# Patient Record
Sex: FEMALE | Race: WHITE | NOT HISPANIC OR LATINO | Employment: FULL TIME | URBAN - METROPOLITAN AREA
[De-identification: names, ages, dates, MRNs, and addresses within clinical notes are randomized per-mention and may not be internally consistent; named-entity substitution may affect disease eponyms.]

---

## 2019-09-13 ENCOUNTER — TELEPHONE (OUTPATIENT)
Dept: DERMATOLOGY | Facility: CLINIC | Age: 59
End: 2019-09-13

## 2019-09-13 NOTE — TELEPHONE ENCOUNTER
Telephone call from patient wanting a records release form mailed to her   Mailed form to patient , she will drop it off at [facility] dermatology

## 2019-10-09 ENCOUNTER — OFFICE VISIT (OUTPATIENT)
Dept: DERMATOLOGY | Facility: CLINIC | Age: 59
End: 2019-10-09
Payer: COMMERCIAL

## 2019-10-09 VITALS — HEIGHT: 62 IN | WEIGHT: 128 LBS | TEMPERATURE: 98.5 F | BODY MASS INDEX: 23.55 KG/M2

## 2019-10-09 DIAGNOSIS — D48.5 NEOPLASM OF UNCERTAIN BEHAVIOR OF SKIN: ICD-10-CM

## 2019-10-09 DIAGNOSIS — L71.9 ROSACEA: ICD-10-CM

## 2019-10-09 DIAGNOSIS — Z85.828 HISTORY OF BASAL CELL CARCINOMA: Primary | ICD-10-CM

## 2019-10-09 PROCEDURE — 99214 OFFICE O/P EST MOD 30 MIN: CPT | Performed by: DERMATOLOGY

## 2019-10-09 PROCEDURE — 88305 TISSUE EXAM BY PATHOLOGIST: CPT | Performed by: PATHOLOGY

## 2019-10-09 PROCEDURE — 11102 TANGNTL BX SKIN SINGLE LES: CPT | Performed by: DERMATOLOGY

## 2019-10-09 RX ORDER — ESTRADIOL AND NORETHINDRONE ACETATE 1; .5 MG/1; MG/1
1 TABLET ORAL DAILY
COMMUNITY

## 2019-10-09 RX ORDER — SULFACETAMIDE SODIUM, SULFUR 98; 48 MG/G; MG/G
LIQUID TOPICAL
Qty: 1 BOTTLE | Refills: 6 | Status: SHIPPED | OUTPATIENT
Start: 2019-10-09 | End: 2020-03-03 | Stop reason: SDUPTHER

## 2019-10-09 RX ORDER — MAGNESIUM GLUCONATE 30 MG(550)
1000 TABLET ORAL DAILY
COMMUNITY

## 2019-10-09 RX ORDER — FLUTICASONE PROPIONATE 50 MCG
1 SPRAY, SUSPENSION (ML) NASAL DAILY
COMMUNITY

## 2019-10-09 RX ORDER — PANTOPRAZOLE SODIUM 40 MG/1
40 TABLET, DELAYED RELEASE ORAL 2 TIMES DAILY
COMMUNITY
Start: 2017-04-07

## 2019-10-09 RX ORDER — FEXOFENADINE HCL 180 MG/1
180 TABLET ORAL DAILY PRN
COMMUNITY

## 2019-10-09 RX ORDER — OLOPATADINE HYDROCHLORIDE 1 MG/ML
1 SOLUTION/ DROPS OPHTHALMIC 2 TIMES DAILY
COMMUNITY
Start: 2018-10-09 | End: 2019-10-09

## 2019-10-09 RX ORDER — LEVOTHYROXINE SODIUM 88 UG/1
88 TABLET ORAL DAILY
COMMUNITY
Start: 2019-09-19

## 2019-10-09 NOTE — PROGRESS NOTES
[address] [facility] Note     Patient Name: Niels Carreno  Encounter Date: 10/09/2019    Today's Chief Concerns:  Jean Vibha Concern #1:  Skin exam    Past Medical History:  Have you ever had or currently have any of the following medical conditions or treatments? · HIV/AIDS: No  · Hepatitis B: No  · Hepatitis C: No   · Diabetes: No  · Tuberculosis: No  · Biologic Therapy/Chemotherapy: No  · Organ or Bone Marrow Transplantation: No  · Radiation Treatment: No  · Cancer (If Yes, which types)- No      Have you ever had any of the following skin conditions? · Melanoma? (If Yes, please provide more detail)- No  · Basal Cell Carcinoma: YES  · Squamous Cell Carcinoma: No  · Sebaceous Cell Carcinoma: No  · Merkel Cell Carcinoma: No  · Angiosarcoma: No  · Blistering Sunburns: No  · Eczema: No  · Psoriasis: No    Social History:    What is your current Smoking Status? Non smoker    What is/was your primary occupation? Teacher    What are your hobbies/past-times? Family history:  Do any of your "first degree relatives" (parent, brother, sister, or child) have any of the following conditions? · Melanoma? (If Yes, which relatives?) No  · Eczema: No  · Asthma: No  · Hay Fever/Seasonal Allergies: YES, mother  · Psoriasis: No  · Arthritis: YES, father  · Thyroid Problems: No  · Lupus/Connective Tissue Disease: No  · Diabetes: No  · Stroke: YES, father  · Blood Clots: No  · IBD/Crohn's/Ulcerative Colitis: No  · Vitiligo: No  · Scarring/Keloids: No  · Severe Acne: No  · Pancreatic Cancer: No  · Other known Skin Condition? If Yes, what condition and which relatives?   No    Current Medications:    Current Outpatient Medications:     Cyanocobalamin (VITAMIN B 12 PO), Take 500 mg by mouth daily, Disp: , Rfl:     estradiol-norethindrone (ACTIVELLA) 1-0 5 MG per tablet, Take 1 tablet by mouth daily, Disp: , Rfl:     fexofenadine (ALLEGRA) 180 MG tablet, Take 180 mg by mouth daily as needed, Disp: , Rfl:    fluticasone (FLONASE) 50 mcg/act nasal spray, 1 spray into each nostril daily, Disp: , Rfl:     levothyroxine 88 mcg tablet, Take 88 mcg by mouth daily, Disp: , Rfl:     Magnesium Gluconate 550 MG TABS, Take 1,000 mg by mouth daily, Disp: , Rfl:     olopatadine (PATANOL) 0 1 % ophthalmic solution, Apply 1 drop to eye 2 (two) times a day, Disp: , Rfl:     pantoprazole (PROTONIX) 40 mg tablet, Take 40 mg by mouth 2 (two) times a day, Disp: , Rfl:     Specific Alerts:    Have you been seen by a [facility] Dermatologist in the last 3 years? YES    Are you pregnant or planning to become pregnant? N/A    Are you currently or planning to be nursing or breast feeding? N/A    No Known Allergies    May we call your Preferred Phone number to discuss your specific medical information? YES    May we leave a detailed message that includes your specific medical information? YES    Have you traveled outside of the [facility] in the past 3 months? No    Do you currently have a pacemaker or defibrillator? No    Do you have any artificial heart valves, joints, plates, screws, rods, stents, pins, etc? No   - If Yes, were any placed within the last 2 years? Do you require any medications prior to a surgical procedure? No   - If Yes, for which procedure? - If Yes, what medications to you require? Are you taking any medications that cause you to bleed more easily ("blood thinners") No    Have you ever experienced a rapid heartbeat with epinephrine? No    Have you ever been treated with "gold" (gold sodium thiomalate) therapy? No    [facility] can help with wrinkles, "laugh lines," facial volume loss, "double chin," "love handles," age spots, and more  Are you interested in learning today about some of the skin enhancement procedures that we offer? (If Yes, please provide more detail) No    Review of Systems:  Have you recently had or currently have any of the following?     · Fever or chills: No  · Night Sweats: No  · Headaches: YES, allergies  · Weight Gain: No  · Weight Loss: No  · Blurry Vision: No  · Nausea: No  · Vomiting: No  · Diarrhea: No  · Blood in Stool: No  · Abdominal Pain: No  · Itchy Skin: No  · Painful Joints: No  · Swollen Joints: No  · Muscle Pain: No  · Irregular Mole: No  · Sun Burn: No  · Dry Skin: No  · Skin Color Changes: No  · Scar or Keloid: No  · Cold Sores/Fever Blisters: No  · Bacterial Infections/MRSA: No  · Anxiety: YES  · Depression: No  · Suicidal or Homicidal Thoughts: No      PHYSICAL EXAM:      Was a chaperone (Derm Clinical Assistant) present for the entirety of the Physical Exam? YES    Did the Dermatology Team specifically ask and  the patient on the importance of a Full Skin Exam to be sure that nothing is missed clinically?  YES    Did the patient request or accept a Full Skin Exam?  YES    Did the patient specifically refuse to have the areas "under-the-bra" examined by the Dermatologist? No    Did the patient specifically refuse to have the areas "under-the-underwear" examined by the Dermatologist? No      CONSTITUTIONAL:   Vitals:    10/09/19 1454   Temp: 98 5 °F (36 9 °C)   TempSrc: Tympanic   Weight: 58 1 kg (128 lb)   Height: 5' 2" (1 575 m)       Today's Height:   5' 2"  Today's Weight (in kilograms):   58 1 kg  Today's Temperature:   98 5 F    PSYCH: Normal mood and affect  EYES: Normal conjunctiva  ENT: Normal lips and oral mucosa  CARDIOVASCULAR: No edema  RESPIRATORY: Normal respirations  HEME/LYMPH/IMMUNO:  No regional lymphadenopathy except as noted below in ASSESSMENT AND PLAN BY DIAGNOSIS    FULL ORGAN SYSTEM SKIN EXAM (SKIN)  Hair, Scalp, Ears, Face Normal except as noted below in Assessment   Neck, Cervical Chain Nodes Normal except as noted below in Assessment   Right Arm/Hand/Fingers Normal except as noted below in Assessment   Left Arm/Hand/Fingers Normal except as noted below in Assessment   Chest/Breasts/Axillae Viewed areas Normal except as noted below in Assessment   Abdomen, Umbilicus Normal except as noted below in Assessment   Back/Spine Normal except as noted below in Assessment   Groin/Genitalia/Buttocks Viewed areas Normal except as noted below in Assessment   Right Leg, Foot, Toes Normal except as noted below in Assessment   Left Leg, Foot, Toes Normal except as noted below in Assessment        ASSESSMENT AND PLAN BY DIAGNOSIS:    History of Present Condition:    1  ROSACEA    Physical Exam:   Anatomic Location Affected: Face   Morphological Description:  Erythema with mild fine papularity   Pertinent Positives:   Pertinent Negatives:No lymphadenopathy    Additional History of Present Condition:      Assessment and Plan:  Based on a thorough discussion of this condition and the management approach to it (including a comprehensive discussion of the known risks, side effects and potential benefits of treatment), the patient (family) agrees to implement the following specific plan:   Plexion cleanser   Metronidazole cream twice daily    Rosacea is a chronic rash affecting the mid-face including the nose, cheeks, chin, forehead, and eyelids  The incidence is usually greatest between the ages of 30-60 years and is more common in people with fair skin  Common characteristics include redness, telangiectasias, papules and pustules over affected areas  Rosacea may look similar to acne, but there is a lack of comedones  Occasionally the eyes may also be involved in ocular rosacea  In advanced disease, enlargement of the sebaceous glands in the nose, termed rhinophyma, may be present  Rosacea results in red spots (papules) and sometimes pustules over the face, but unlike acne there are no blackheads, whiteheads, or cystic nodules  Patients often experience increased facial flushing with prominent blood vessels (erythematotelangiectatic rosacea) and dry, sensitive skin   These symptoms are exacerbated by sun exposure, hot or spicy foods, topical steroids and oil-based facial products  In ocular rosacea, eyelids may be red and sore due to conjunctivitis, keratitis, and episcleritis  If rhinophyma develops due to enlargement of sebaceous glands, the patient may have an enlarged and irregularly shaped nose with prominent pores  In rosacea that is refractory to treatment, patients can develop persistent redness and swelling of the face due to lymphatic obstruction (Morbihan disease)  Distribution around the cheeks may be confused with the malar or butterfly rash of lupus  However, the rash of lupus spares the nasal creases and lacks papules and pustules  If signs of photosensitivity, oral ulcers, arthritis, and kidney dysfunction are present then consider referral to a rheumatologist      There are many potential causes of rosacea including genetic, environmental, vascular, and inflammatory factors   These include, but are not limited to:   Chronic exposure to ultraviolet radiation    Increased immune responses in the form of cathelicidins that promote vessel dilation and infiltration with white blood cells (neutrophils) into the dermis   Increased matrix metalloproteinases such as collagen and elastase that remodel normal tissue may contribute to inflammation of the skin making it thicker and harder   There is some evidence to suggest that increased numbers of demodex mites on patient skin may contribute to rosacea papules     General Treatment Approach    Avoid exacerbating factors such as heat, spicy foods, and alcohol    Use daily SPF30+ sunscreen and other methods of coverage for sun protection   Use water-based make-up    Avoid applying topical steroids to affected areas as they can cause perioral dermatitis and exacerbate rosacea     Topical Treatment Approach   Metronidazole cream or gel by itself or in combination with oral antibiotics for more severe cases   Azelaic acid cream or lotion is effective for mild inflammatory rosacea when applied twice daily to affected areas   Brimonidine gel and oxymetazoline hydrochloride cream can reduce facial redness temporarily    Ivermectin cream can treat papulopustular rosacea by controlling demodex mites and inflammation    Pimecrolimus cream or tacrolimus ointment twice a day for 2-3 months can help reduce inflammation    Oral Treatment Approach   Antibiotics such as doxycycline, minocycline, or erythromycin for 1-3 months   Clonidine and carvedilol can help reduce facial flushing and are generally well tolerated  Common side effects include low blood pressure, gastrointestinal upset, dry eyes, blurred vision and low heart rate   Isotretinoin at low doses can be effective for long term treatment when antibiotics fail  Side effects may make it unsuitable for some patients   NSAIDs such as diclofenac can help reduce discomfort and redness in the skin  Procedural/Surgical Treatment Approach    Vascular lasers or intense pulsed light treatment may be used to treat persistent telangiectasia and papulopustular rosacea   Plastic surgery and carbon dioxide lasers may be used to treat rhinophyma       2   HISTORY OF BASAL CELL CARCINOMA GREATER THAN 30 SITES    Physical Exam:   Anatomic Location Affected:  Mulitple sites   Morphological Description of scar:  Well healed   Suspected Recurrence: No   Pertinent Positives:   Pertinent Negatives:No lymphadenopathy      Additional History of Present Condition:  History of basal cell carcinoma with no sign of recurrence    Assessment and Plan:  Based on a thorough discussion of this condition and the management approach to it (including a comprehensive discussion of the known risks, side effects and potential benefits of treatment), the patient (family) agrees to implement the following specific plan:   Continue routine skin exams   Recommend sun protection SPF 48 or higher    How can basal cell carcinoma be prevented? The most important way to prevent BCC is to avoid sunburn  This is especially important in childhood and early life  Fair skinned individuals and those with a personal or family history of BCC should protect their skin from sun exposure daily, year-round and lifelong   Stay indoors or under the shade in the middle of the day    Wear covering clothing    Apply high protection factor SPF50+ broad-spectrum sunscreens generously to exposed skin if outdoors    Avoid indoor tanning (sun beds, solaria)   Oral nicotinamide (vitamin B3) in a dose of 500 mg twice daily may reduce the number and severity of BCCs  What is the outlook for basal cell carcinoma? Most BCCs are cured by treatment  Cure is most likely if treatment is undertaken when the lesion is small  About 50% of people with BCC develop a second one within 3 years of the first  They are also at increased risk of other skin cancers, especially melanoma  Regular self-skin examinations and long-term annual skin checks by an experienced health professional are recommended  3  NEOPLASM OF UNCERTAIN BEHAVIOR OF SKIN    Physical Exam:   (Anatomic Location); (Size and Morphological Description); (Differential Diagnosis):  o Mid upper back; 1 cm pink red macule; Rule out Basal cell carcinoma   Pertinent Positives:   Pertinent Negatives:No lymphadenopathy    Additional History of Present Condition:      Assessment and Plan:   I have discussed with the patient that a sample of skin via a "skin biopsy would be potentially helpful to further make a specific diagnosis under the microscope   Based on a thorough discussion of this condition and the management approach to it (including a comprehensive discussion of the known risks, side effects and potential benefits of treatment), the patient (family) agrees to implement the following specific plan:    o Procedure:  Skin Biopsy    After a thorough discussion of treatment options and risk/benefits/alternatives (including but not limited to local pain, scarring, dyspigmentation, blistering, possible superinfection, and inability to confirm a diagnosis via histopathology), verbal and written consent were obtained and portion of the rash was biopsied for tissue sample  See below for consent that was obtained from patient and subsequent Procedure Note  PROCEDURE SHAVE BIOPSY NOTE:     Performing Physician: Dr Gregory   Anatomic Location; Clinical Description with size (cm); Pre-Op Diagnosis:   o Mid upper back; 1 cm pink red macule; Rule out Basal cell carcinoma   Post-op diagnosis: Same      Local anesthesia: 1% xylocaine with epi       Topical anesthesia: None     Hemostasis: Aluminum chloride       After obtaining informed consent  at which time there was a discussion about the purpose of biopsy  and low risks of infection and bleeding  The area was prepped and draped in the usual fashion  Anesthesia was obtained with 1% lidocaine with epinephrine  A shave biopsy to an appropriate sampling depth was obtained with a sterile blade (such as a 15-blade or DermaBlade)  The resulting wound was covered with surgical ointment and bandaged appropriately  The patient tolerated the procedure well without complications and was without signs of functional compromise  Specimen has been sent for review by Dermatopathology  Standard post-procedure care has been explained and has been included in written form within the patient's copy of Informed Consent  INFORMED CONSENT DISCUSSION AND POST-OPERATIVE INSTRUCTIONS FOR PATIENT    I   RATIONALE FOR PROCEDURE  I understand that a skin biopsy allows the Dermatologist to test a lesion or rash under the microscope to obtain a diagnosis  It usually involves numbing the area with numbing medication and removing a small piece of skin; sometimes the area will be closed with sutures  In this specific procedure, sutures are not usually needed    If any sutures are placed, then they are usually need to be removed in 2 weeks or less  I understand that my Dermatologist recommends that a skin "shave" biopsy be performed today  A local anesthetic, similar to the kind that a dentist uses when filling a cavity, will be injected with a very small needle into the skin area to be sampled  The injected skin and tissue underneath "will go to sleep and become numb so no pain should be felt afterwards  An instrument shaped like a tiny "razor blade" (shave biopsy instrument) will be used to cut a small piece of tissue and skin from the area so that a sample of tissue can be taken and examined more closely under the microscope  A slight amount of bleeding will occur, but it will be stopped with direct pressure and a pressure bandage and any other appropriate methods  I understands that a scar will form where the wound was created  Surgical ointment will be applied to help protect the wound  Sutures are not usually needed  II   RISKS AND POTENTIAL COMPLICATIONS   I understand the risks and potential complications of a skin biopsy include but are not limited to the following:   Bleeding   Infection   Pain   Scar/keloid   Skin discoloration   Incomplete Removal   Recurrence   Nerve Damage/Numbness/Loss of Function   Allergic Reaction to Anesthesia   Biopsies are diagnostic procedures and based on findings additional treatment or evaluation may be required   Loss or destruction of specimen resulting in no additional findings    My Dermatologist has explained to me the nature of the condition, the nature of the procedure, and the benefits to be reasonably expected compared with alternative approaches  My Dermatologist has discussed the likelihood of major risks or complications of this procedure including the specific risks listed above, such as bleeding, infection, and scarring/keloid  I understand that a scar is expected after this procedure    I understand that my physician cannot predict if the scar will form a "keloid," which extends beyond the borders of the wound that is created  A keloid is a thick, painful, and bumpy scar  A keloid can be difficult to treat, as it does not always respond well to therapy, which includes injecting cortisone directly into the keloid every few weeks  While this usually reduces the pain and size of the scar, it does not eliminate it  I understand that photographs may be taken before and after the procedure  These will be maintained as part of the medical providers confidential records and may not be made available to me  I further authorize the medical provider to use the photographs for teaching purposes or to illustrate scientific papers, books, or lectures if in his/her judgment, medical research, education, or science may benefit from its use  I have had an opportunity to fully inquire about the risks and benefits of this procedure and its alternatives  I have been given ample time and opportunity to ask questions and to seek a second opinion if I wished to do so  I acknowledge that there have specifically been no guarantees as to the cosmetic results from the procedure  I am aware that with any procedure there is always the possibility of an unexpected complication  III  POST-PROCEDURAL CARE (WHAT YOU WILL NEED TO DO "AFTER THE BIOPSY" TO OPTIMIZE HEALING)     Keep the area clean and dry  Try NOT to remove the bandage or get it wet for the first 24 hours   Gently clean the area and apply surgical ointment (such as Vaseline petrolatum ointment, which is available "over the counter" and not a prescription) to the biopsy site for up to 2 weeks straight  This acts to protect the wound from the outside world   Sutures are not usually placed in this procedure  If any sutures were placed, return for suture removal as instructed (generally 1 week for the face, 2 weeks for the body)   Take Acetaminophen (Tylenol) for discomfort, if no contraindications  Ibuprofen or aspirin could make bleeding worse   Call our office immediately for signs of infection: fever, chills, increased redness, warmth, tenderness, discomfort/pain, or pus or foul smell coming from the wound  WHAT TO DO IF THERE IS ANY BLEEDING? If a small amount of bleeding is noticed, place a clean cloth over the area and apply firm pressure for ten minutes  Check the wound after 10 minutes of direct pressure  If bleeding persists, try one more time for an additional 10 minutes of direct pressure on the area  If the bleeding becomes heavier or does not stop after the second attempt, or if you have any other questions about this procedure, then please call your [address] [place] [address]'s Dermatologist by calling [phone] (SKIN)  I hereby acknowledge that I have reviewed and verified the site with my Dermatologist and have requested and authorized my Dermatologist to proceed with the procedure        Scribe Attestation    I,:   Carolyn Gao MA am acting as a scribe while in the presence of the attending physician :        I,:   Gabriel Cobb MD personally performed the services described in this documentation    as scribed in my presence :

## 2019-10-09 NOTE — PATIENT INSTRUCTIONS
1  ROSACEA    Physical Exam:   Anatomic Location Affected: Face   Morphological Description:  Erythema with mild fine papularity    Assessment and Plan:  Based on a thorough discussion of this condition and the management approach to it (including a comprehensive discussion of the known risks, side effects and potential benefits of treatment), the patient (family) agrees to implement the following specific plan:   Plexion cleanser daily   Metronidazole cream twice a day    Rosacea is a chronic rash affecting the mid-face including the nose, cheeks, chin, forehead, and eyelids  The incidence is usually greatest between the ages of 30-60 years and is more common in people with fair skin  Common characteristics include redness, telangiectasias, papules and pustules over affected areas  Rosacea may look similar to acne, but there is a lack of comedones  Occasionally the eyes may also be involved in ocular rosacea  In advanced disease, enlargement of the sebaceous glands in the nose, termed rhinophyma, may be present  Rosacea results in red spots (papules) and sometimes pustules over the face, but unlike acne there are no blackheads, whiteheads, or cystic nodules  Patients often experience increased facial flushing with prominent blood vessels (erythematotelangiectatic rosacea) and dry, sensitive skin  These symptoms are exacerbated by sun exposure, hot or spicy foods, topical steroids and oil-based facial products  In ocular rosacea, eyelids may be red and sore due to conjunctivitis, keratitis, and episcleritis  If rhinophyma develops due to enlargement of sebaceous glands, the patient may have an enlarged and irregularly shaped nose with prominent pores  In rosacea that is refractory to treatment, patients can develop persistent redness and swelling of the face due to lymphatic obstruction (Morbihan disease)  Distribution around the cheeks may be confused with the malar or butterfly rash of lupus  However, the rash of lupus spares the nasal creases and lacks papules and pustules  If signs of photosensitivity, oral ulcers, arthritis, and kidney dysfunction are present then consider referral to a rheumatologist      There are many potential causes of rosacea including genetic, environmental, vascular, and inflammatory factors  These include, but are not limited to:   Chronic exposure to ultraviolet radiation    Increased immune responses in the form of cathelicidins that promote vessel dilation and infiltration with white blood cells (neutrophils) into the dermis   Increased matrix metalloproteinases such as collagen and elastase that remodel normal tissue may contribute to inflammation of the skin making it thicker and harder   There is some evidence to suggest that increased numbers of demodex mites on patient skin may contribute to rosacea papules     General Treatment Approach    Avoid exacerbating factors such as heat, spicy foods, and alcohol    Use daily SPF30+ sunscreen and other methods of coverage for sun protection   Use water-based make-up    Avoid applying topical steroids to affected areas as they can cause perioral dermatitis and exacerbate rosacea     Topical Treatment Approach   Metronidazole cream or gel by itself or in combination with oral antibiotics for more severe cases   Azelaic acid cream or lotion is effective for mild inflammatory rosacea when applied twice daily to affected areas   Brimonidine gel and oxymetazoline hydrochloride cream can reduce facial redness temporarily    Ivermectin cream can treat papulopustular rosacea by controlling demodex mites and inflammation    Pimecrolimus cream or tacrolimus ointment twice a day for 2-3 months can help reduce inflammation    Oral Treatment Approach   Antibiotics such as doxycycline, minocycline, or erythromycin for 1-3 months   Clonidine and carvedilol can help reduce facial flushing and are generally well tolerated  Common side effects include low blood pressure, gastrointestinal upset, dry eyes, blurred vision and low heart rate   Isotretinoin at low doses can be effective for long term treatment when antibiotics fail  Side effects may make it unsuitable for some patients   NSAIDs such as diclofenac can help reduce discomfort and redness in the skin  Procedural/Surgical Treatment Approach    Vascular lasers or intense pulsed light treatment may be used to treat persistent telangiectasia and papulopustular rosacea   Plastic surgery and carbon dioxide lasers may be used to treat rhinophyma       2  HISTORY OF BASAL CELL CARCINOMA GREATER THAN 30 SITES    Physical Exam:   Anatomic Location Affected:  Mulitple sites   Morphological Description of scar:  Well healed      Additional History of Present Condition:  History of basal cell carcinoma with no sign of recurrence    Assessment and Plan:  Based on a thorough discussion of this condition and the management approach to it (including a comprehensive discussion of the known risks, side effects and potential benefits of treatment), the patient (family) agrees to implement the following specific plan:   Continue routine skin exams   Recommend sun protection SPF 48 or higher    How can basal cell carcinoma be prevented? The most important way to prevent BCC is to avoid sunburn  This is especially important in childhood and early life  Fair skinned individuals and those with a personal or family history of BCC should protect their skin from sun exposure daily, year-round and lifelong   Stay indoors or under the shade in the middle of the day    Wear covering clothing    Apply high protection factor SPF50+ broad-spectrum sunscreens generously to exposed skin if outdoors    Avoid indoor tanning (sun beds, solaria)   Oral nicotinamide (vitamin B3) in a dose of 500 mg twice daily may reduce the number and severity of BCCs      What is the outlook for basal cell carcinoma? Most BCCs are cured by treatment  Cure is most likely if treatment is undertaken when the lesion is small  About 50% of people with BCC develop a second one within 3 years of the first  They are also at increased risk of other skin cancers, especially melanoma  Regular self-skin examinations and long-term annual skin checks by an experienced health professional are recommended  3  NEOPLASM OF UNCERTAIN BEHAVIOR OF SKIN    Physical Exam:   (Anatomic Location); (Size and Morphological Description); (Differential Diagnosis):  o Mid upper back; 1 cm pink red macule; Rule out Basal cell carcinoma    Assessment and Plan:   I have discussed with the patient that a sample of skin via a "skin biopsy would be potentially helpful to further make a specific diagnosis under the microscope   Based on a thorough discussion of this condition and the management approach to it (including a comprehensive discussion of the known risks, side effects and potential benefits of treatment), the patient (family) agrees to implement the following specific plan:    o Procedure:  Skin Biopsy  After a thorough discussion of treatment options and risk/benefits/alternatives (including but not limited to local pain, scarring, dyspigmentation, blistering, possible superinfection, and inability to confirm a diagnosis via histopathology), verbal and written consent were obtained and portion of the rash was biopsied for tissue sample  See below for consent that was obtained from patient and subsequent Procedure Note  PROCEDURE SHAVE BIOPSY NOTE:     Performing Physician: Dr Gregory      INFORMED CONSENT DISCUSSION AND POST-OPERATIVE INSTRUCTIONS FOR PATIENT    I   RATIONALE FOR PROCEDURE  I understand that a skin biopsy allows the Dermatologist to test a lesion or rash under the microscope to obtain a diagnosis    It usually involves numbing the area with numbing medication and removing a small piece of skin; sometimes the area will be closed with sutures  In this specific procedure, sutures are not usually needed  If any sutures are placed, then they are usually need to be removed in 2 weeks or less  I understand that my Dermatologist recommends that a skin "shave" biopsy be performed today  A local anesthetic, similar to the kind that a dentist uses when filling a cavity, will be injected with a very small needle into the skin area to be sampled  The injected skin and tissue underneath "will go to sleep and become numb so no pain should be felt afterwards  An instrument shaped like a tiny "razor blade" (shave biopsy instrument) will be used to cut a small piece of tissue and skin from the area so that a sample of tissue can be taken and examined more closely under the microscope  A slight amount of bleeding will occur, but it will be stopped with direct pressure and a pressure bandage and any other appropriate methods  I understands that a scar will form where the wound was created  Surgical ointment will be applied to help protect the wound  Sutures are not usually needed  II   RISKS AND POTENTIAL COMPLICATIONS   I understand the risks and potential complications of a skin biopsy include but are not limited to the following:   Bleeding   Infection   Pain   Scar/keloid   Skin discoloration   Incomplete Removal   Recurrence   Nerve Damage/Numbness/Loss of Function   Allergic Reaction to Anesthesia   Biopsies are diagnostic procedures and based on findings additional treatment or evaluation may be required   Loss or destruction of specimen resulting in no additional findings    My Dermatologist has explained to me the nature of the condition, the nature of the procedure, and the benefits to be reasonably expected compared with alternative approaches    My Dermatologist has discussed the likelihood of major risks or complications of this procedure including the specific risks listed above, such as bleeding, infection, and scarring/keloid  I understand that a scar is expected after this procedure  I understand that my physician cannot predict if the scar will form a "keloid," which extends beyond the borders of the wound that is created  A keloid is a thick, painful, and bumpy scar  A keloid can be difficult to treat, as it does not always respond well to therapy, which includes injecting cortisone directly into the keloid every few weeks  While this usually reduces the pain and size of the scar, it does not eliminate it  I understand that photographs may be taken before and after the procedure  These will be maintained as part of the medical providers confidential records and may not be made available to me  I further authorize the medical provider to use the photographs for teaching purposes or to illustrate scientific papers, books, or lectures if in his/her judgment, medical research, education, or science may benefit from its use  I have had an opportunity to fully inquire about the risks and benefits of this procedure and its alternatives  I have been given ample time and opportunity to ask questions and to seek a second opinion if I wished to do so  I acknowledge that there have specifically been no guarantees as to the cosmetic results from the procedure  I am aware that with any procedure there is always the possibility of an unexpected complication  III  POST-PROCEDURAL CARE (WHAT YOU WILL NEED TO DO "AFTER THE BIOPSY" TO OPTIMIZE HEALING)     Keep the area clean and dry  Try NOT to remove the bandage or get it wet for the first 24 hours   Gently clean the area and apply surgical ointment (such as Vaseline petrolatum ointment, which is available "over the counter" and not a prescription) to the biopsy site for up to 2 weeks straight  This acts to protect the wound from the outside world   Sutures are not usually placed in this procedure    If any sutures were placed, return for suture removal as instructed (generally 1 week for the face, 2 weeks for the body)   Take Acetaminophen (Tylenol) for discomfort, if no contraindications  Ibuprofen or aspirin could make bleeding worse   Call our office immediately for signs of infection: fever, chills, increased redness, warmth, tenderness, discomfort/pain, or pus or foul smell coming from the wound  WHAT TO DO IF THERE IS ANY BLEEDING? If a small amount of bleeding is noticed, place a clean cloth over the area and apply firm pressure for ten minutes  Check the wound after 10 minutes of direct pressure  If bleeding persists, try one more time for an additional 10 minutes of direct pressure on the area  If the bleeding becomes heavier or does not stop after the second attempt, or if you have any other questions about this procedure, then please call your [facility] by calling [phone] (SKIN)  I hereby acknowledge that I have reviewed and verified the site with my Dermatologist and have requested and authorized my Dermatologist to proceed with the procedure

## 2019-10-11 ENCOUNTER — TELEPHONE (OUTPATIENT)
Dept: DERMATOLOGY | Facility: CLINIC | Age: 59
End: 2019-10-11

## 2019-10-11 NOTE — TELEPHONE ENCOUNTER
Telephone call to patient to check on biopsy site  Left message for patient to call with any concerns and that Dr Betty Abebe will call her as soon as her report becomes available

## 2019-10-14 NOTE — RESULT ENCOUNTER NOTE
I discussed with patient biopsy showed BCE  We will elect to excise  Staff to call patient and schedule a l surgical procedure at office

## 2019-10-18 ENCOUNTER — TELEPHONE (OUTPATIENT)
Dept: DERMATOLOGY | Facility: CLINIC | Age: 59
End: 2019-10-18

## 2019-10-18 NOTE — TELEPHONE ENCOUNTER
Telephone call to patient to schedule excision for [address]  Subtextual on mid upper back  Left message for patient to call back to schedule                   ----- Message from Natalie Baez MD sent at 10/14/2019  4:56 PM EDT -----  I discussed with patient biopsy showed BCE  We will elect to excise  Staff to call patient and schedule a l surgical procedure at office

## 2019-10-31 ENCOUNTER — PROCEDURE VISIT (OUTPATIENT)
Dept: DERMATOLOGY | Facility: CLINIC | Age: 59
End: 2019-10-31
Payer: COMMERCIAL

## 2019-10-31 VITALS — WEIGHT: 128 LBS | HEIGHT: 62 IN | BODY MASS INDEX: 23.55 KG/M2 | TEMPERATURE: 98.7 F

## 2019-10-31 DIAGNOSIS — C44.519 BASAL CELL CARCINOMA (BCC) OF BACK: Primary | ICD-10-CM

## 2019-10-31 PROCEDURE — 88305 TISSUE EXAM BY PATHOLOGIST: CPT | Performed by: STUDENT IN AN ORGANIZED HEALTH CARE EDUCATION/TRAINING PROGRAM

## 2019-10-31 PROCEDURE — 11602 EXC TR-EXT MAL+MARG 1.1-2 CM: CPT | Performed by: DERMATOLOGY

## 2019-10-31 PROCEDURE — 12032 INTMD RPR S/A/T/EXT 2.6-7.5: CPT | Performed by: DERMATOLOGY

## 2019-10-31 NOTE — PROGRESS NOTES
[facility] Follow Up Note    Patient Name: Niels Carreno  Encounter Date: 10/31/2019    Today's Chief Concerns:  [facility] Concern #1:  Excision of Basal cell carcinoma mid upper back      Current Medications:    Current Outpatient Medications:     Cyanocobalamin (VITAMIN B 12 PO), Take 500 mg by mouth daily, Disp: , Rfl:     estradiol-norethindrone (ACTIVELLA) 1-0 5 MG per tablet, Take 1 tablet by mouth daily, Disp: , Rfl:     fexofenadine (ALLEGRA) 180 MG tablet, Take 180 mg by mouth daily as needed, Disp: , Rfl:     fluticasone (FLONASE) 50 mcg/act nasal spray, 1 spray into each nostril daily, Disp: , Rfl:     levothyroxine 88 mcg tablet, Take 88 mcg by mouth daily, Disp: , Rfl:     Magnesium Gluconate 550 MG TABS, Take 1,000 mg by mouth daily, Disp: , Rfl:     metroNIDAZOLE (METROCREAM) 0 75 % cream, Apply topically twice daily, Disp: 45 g, Rfl: 6    pantoprazole (PROTONIX) 40 mg tablet, Take 40 mg by mouth 2 (two) times a day, Disp: , Rfl:     Sulfacetamide Sodium-Sulfur 9 8-4 8 % LIQD, Wash daily, Disp: 1 Bottle, Rfl: 6    olopatadine (PATANOL) 0 1 % ophthalmic solution, Apply 1 drop to eye 2 (two) times a day, Disp: , Rfl:     CONSTITUTIONAL:   Vitals:    10/31/19 1001   Temp: 98 7 °F (37 1 °C)   TempSrc: Tympanic   Weight: 58 1 kg (128 lb)   Height: 5' 2" (1 575 m)       No Known Allergies      PSYCH: Normal mood and affect  EYES: Normal conjunctiva  ENT: Normal lips and oral mucosa  CARDIOVASCULAR: No edema  RESPIRATORY: Normal respirations  HEME/LYMPH/IMMUNO:  No regional lymphadenopathy except as noted below in ASSESSMENT AND PLAN BY DIAGNOSIS    FULL ORGAN SYSTEM SKIN EXAM (SKIN)                          Back/Spine Normal except as noted below in Assessment                 1  BASAL CELL CARCINOMA    Physical Exam:   Anatomic Location Affected:  Mid upper back   Morphological Description:  1 1 cm crusted erythematous macule   Pertinent Positives:   Pertinent Negatives:No regional lymphadenopathy    Additional History of Present Condition:  Previous accession # [id number] from 10/09/2019    Assessment and Plan:  Based on a thorough discussion of this condition and the management approach to it (including a comprehensive discussion of the known risks, side effects and potential benefits of treatment), the patient (family) agrees to implement the following specific plan:   Excision today    PROCEDURE:  EXCISION WITH INTERMEDIATE LAYERED CLOSURE     Attending: Dr Gregory  Assistant: Rhoda White    Pre-Op Diagnosis: Basal cell carcinoma  Post-Op Diagnosis: Same   Benign versus Malignant Malignant      Lesion Anatomic Location: Mid upper back (Previous Accession Number: [id number])  Pre-op size: 1 1cm  Size of defect:  1 5 cm (with 0 2 centimeter margins)  Final repaired wound length:  3 cm    Written and verbal, witnessed informed consent was obtained  I discussed that excision is a method of removing lesions both benign and malignant lesions  A portion of normal skin is often taken to ensure completeness of removal   I reviewed that procedure will include numbing the area,  cutting around and under defect, undermining tissue, and closing the wound with sutures both inside and out  These sutures are usually removed in 7 to 14 days  Risks (bleeding, pain, infection, scarring, recurrence) and benefits discussed  It was discussed with patient that every effort is made to minimize scar, but scarring is influenced also by extrinsic factor such as location, age and genetics  Time Out: performed:  yes  Correct patient: yes  Correct site per Clinic Report: yes  Correct site per Patient Report: yes    LOCAL ANESTHESIA: 1% xylocaine with epi     DESCRIPTION OF PROCEDURE: The patient was brought back into the procedure room, anesthetized locally, prepped and draped in the usual fashion  Using a #15 blade with a scalpel, the lesion was excised in elliptical fashion   The wound was  undermined in the  fascial plane  Hemostasis was achieved with light electrocoagulation  Purpose of undermining was to decrease wound tension and facilitate closure  The wound was closed with subcutaneous sutures as follows:    Deep suture:3-0 Vicryl      Epidermal edge closure was accomplished with superficial sutures as follows:    Superficial suture: 4-0 Ethilon  Superficial suture type: Interrupted     Estimated blood loss less than 3ml  The patient tolerated the procedure well without any complications  The wound was cleaned with sterile saline, dried off, surgical vaseline ointment was applied, and the wound was covered  A pressure dressing was applied for stabilization and light pressure  The patient was given detailed oral and written instructions on postoperative care as detailed in consent  The patient left in good medical condition  POSTOP DISCUSSION DISCUSSION AND INSTRUCTIONS FOR PATIENT      Rationale for Procedure  A skin excision allows the dermatologist to remove a lesion  The procedure involves a local numbing medication and removing the entire lesion  Typically, the lesion is being removed because it is atypical, traumatized, or for significant appearance reasons  The area will be open like a brush burn and allowed to heal    There will be no sutures  Tissue is sent to pathologist who will reconfirm diagnosis and verify completeness of lesion removal     Description of Procedure  We would like to perform a skin excision today  A local anesthetic, similar to the kind that a dentist uses when filling a cavity, will be injected with a very small needle into the skin area to be sampled  The injected skin and tissue underneath should go to sleep and become numbed so that no further pain should be felt  A scalpel will be used to cut around the lesion and tissue will be submitted to pathology for examination    Depending on the diagnosis the lesion will be excised with a certain amount of normal skin to help assure completeness of lesion removal   The physician will discuss in advance the anticipated size and extent of removal    Bleeding will occur, but it will stopped with direct pressure, sutures, and electrocautery  Surgical Vaseline-type ointment will also applied after the procedure to help create a barrier between the wound and the outside world  Risks and Potential Complications  The advantage of a skin excision is that it allows us to remove a problem lesion quickly  Although this usually permits the lesion to heal as soon as possible with the least scarring, there are some risks and potential complications that include but are not limited to the following:  - Some bleeding is normal at time of procedure and some bleeding on gauze is normal  the first few days after surgery  Profuse bleeding and bleeding with swelling and pain should be reported as detailed  below  - Infection is uncommon in skin surgery  Infection should be reported and is indicated by pain, redness, and discharge of purulent material   - Some dull to at time sharp pain could occur initially the day after surgery  Persistent pain or increasing pain days after surgery is not expected and should be reported  - Every effort is made to minimize scar, but location, size, and genetics do play a role in scar appearance  A surgical wound does not achieve its optimal appearance until 6 months  There are several treatments available if scarring would be problematic including scar creams, silicone pad, laser and scar revision   - Skin discoloration can occur especially in people of color  Its important to avoid sun on wound in first 6 months after surgery    Treatment is available if pigment is problematic   - Incomplete removal of the lesion or recurrence of lesion can occur and this would then require further treatment and more surgery   - Nerve Damage/Numbness/Loss of Function is very rare, but is most likely to occur if lesion is large or if it is in a high risk location  - Allergic Reaction to lidocaine is rare  More commonly,  epinephrine is used  with the lidocaine  Occasionally, epinephrine (aka adrenalin) may cause a brief  feeling of anxiety or jitteriness  - The person at the microscope  (pathologist) may provide additional information that was unexpected  This unexpected finding could provoke the need for additional treatment or evaluation  What You Will Need to Do After the Procedure  1  Keep the area clean and dry the first day  Try NOT to remove the bandage for the first day  2  Gently clean the area with soap and water and apply Vaseline ointment (this is over the counter and not a prescription) to the excision  site for up to 2 weeks  3  Apply a clean appropriately sized bandage to area  Gauze and paper tape are recommended for sensitive skin  4  Return for suture removal as instructed (generally 1 week for the face, 2 weeks for the body)  5  Take Acetaminophen (Tylenol) for discomfort, if no contraindications  Do NOT take Ibuprofen or aspirin unless specifically told to do so by your Dermatologist because these medications can make bleeding worse  6  Call our office immediately for signs of infection: fever, chills, increased redness, warmth, tenderness, discomfort/pain, or pus or foul smell coming from the wound  If bleeding is noticed, place a clean cloth over the area and apply firm pressure for thirty minutes  Check the wound ONLY after 30 minutes of direct pressure; do not cheat and sneak a peak, as that does not count  If bleeding persists after 30 minutes of legitimate direct pressure, then try one more round of direct pressure for an additional 10 minutes to the area  Should the bleeding become heavier or not stop after the second attempt, call [facility] directly at [phone] (SKIN) or, if after hours, go to your local Emergency Room/Emergency Department        Scribe Attestation    I,:   Nettie David MA am acting as a scribe while in the presence of the attending physician :        I,:   Sundeep Thakkar MD personally performed the services described in this documentation    as scribed in my presence :

## 2019-10-31 NOTE — PATIENT INSTRUCTIONS
1  BASAL CELL CARCINOMA    Physical Exam:   Anatomic Location Affected:  Mid upper back    PROCEDURE:  EXCISION WITH INTERMEDIATE LAYERED CLOSURE     Attending: Dr Gregory    POSTOP DISCUSSION DISCUSSION AND INSTRUCTIONS FOR PATIENT      Rationale for Procedure  A skin excision allows the dermatologist to remove a lesion  The procedure involves a local numbing medication and removing the entire lesion  Typically, the lesion is being removed because it is atypical, traumatized, or for significant appearance reasons  The area will be open like a brush burn and allowed to heal    There will be no sutures  Tissue is sent to pathologist who will reconfirm diagnosis and verify completeness of lesion removal     Description of Procedure  We would like to perform a skin excision today  A local anesthetic, similar to the kind that a dentist uses when filling a cavity, will be injected with a very small needle into the skin area to be sampled  The injected skin and tissue underneath should go to sleep and become numbed so that no further pain should be felt  A scalpel will be used to cut around the lesion and tissue will be submitted to pathology for examination  Depending on the diagnosis the lesion will be excised with a certain amount of normal skin to help assure completeness of lesion removal   The physician will discuss in advance the anticipated size and extent of removal    Bleeding will occur, but it will stopped with direct pressure, sutures, and electrocautery  Surgical Vaseline-type ointment will also applied after the procedure to help create a barrier between the wound and the outside world  Risks and Potential Complications  The advantage of a skin excision is that it allows us to remove a problem lesion quickly    Although this usually permits the lesion to heal as soon as possible with the least scarring, there are some risks and potential complications that include but are not limited to the following:  - Some bleeding is normal at time of procedure and some bleeding on gauze is normal  the first few days after surgery  Profuse bleeding and bleeding with swelling and pain should be reported as detailed  below  - Infection is uncommon in skin surgery  Infection should be reported and is indicated by pain, redness, and discharge of purulent material   - Some dull to at time sharp pain could occur initially the day after surgery  Persistent pain or increasing pain days after surgery is not expected and should be reported  - Every effort is made to minimize scar, but location, size, and genetics do play a role in scar appearance  A surgical wound does not achieve its optimal appearance until 6 months  There are several treatments available if scarring would be problematic including scar creams, silicone pad, laser and scar revision   - Skin discoloration can occur especially in people of color  Its important to avoid sun on wound in first 6 months after surgery  Treatment is available if pigment is problematic   - Incomplete removal of the lesion or recurrence of lesion can occur and this would then require further treatment and more surgery   - Nerve Damage/Numbness/Loss of Function is very rare, but is most likely to occur if lesion is large or if it is in a high risk location  - Allergic Reaction to lidocaine is rare  More commonly,  epinephrine is used  with the lidocaine  Occasionally, epinephrine (aka adrenalin) may cause a brief  feeling of anxiety or jitteriness  - The person at the microscope  (pathologist) may provide additional information that was unexpected  This unexpected finding could provoke the need for additional treatment or evaluation  What You Will Need to Do After the Procedure  1  Keep the area clean and dry the first day  Try NOT to remove the bandage for the first day    2  Gently clean the area with soap and water and apply Vaseline ointment (this is over the counter and not a prescription) to the excision  site for up to 2 weeks  3  Apply a clean appropriately sized bandage to area  Gauze and paper tape are recommended for sensitive skin  4  Return for suture removal as instructed (generally 1 week for the face, 2 weeks for the body)  5  Take Acetaminophen (Tylenol) for discomfort, if no contraindications  Do NOT take Ibuprofen or aspirin unless specifically told to do so by your Dermatologist because these medications can make bleeding worse  6  Call our office immediately for signs of infection: fever, chills, increased redness, warmth, tenderness, discomfort/pain, or pus or foul smell coming from the wound  If bleeding is noticed, place a clean cloth over the area and apply firm pressure for thirty minutes  Check the wound ONLY after 30 minutes of direct pressure; do not cheat and sneak a peak, as that does not count  If bleeding persists after 30 minutes of legitimate direct pressure, then try one more round of direct pressure for an additional 10 minutes to the area  Should the bleeding become heavier or not stop after the second attempt, call [facility] directly at [phone] (SKIN) or, if after hours, go to your local Emergency Room/Emergency Department

## 2019-11-01 ENCOUNTER — TELEPHONE (OUTPATIENT)
Dept: DERMATOLOGY | Facility: CLINIC | Age: 59
End: 2019-11-01

## 2019-11-01 NOTE — TELEPHONE ENCOUNTER
Follow Up Call     Pt saw: Dr Gregory   Telephone call to patient in regards to follow up on procedure (biopsy site)    Left message to return our call with any questions or concerns

## 2019-11-14 ENCOUNTER — OFFICE VISIT (OUTPATIENT)
Dept: DERMATOLOGY | Facility: CLINIC | Age: 59
End: 2019-11-14

## 2019-11-14 VITALS — HEIGHT: 62 IN | BODY MASS INDEX: 23.55 KG/M2 | WEIGHT: 128 LBS | TEMPERATURE: 98.3 F

## 2019-11-14 DIAGNOSIS — Z48.02 VISIT FOR SUTURE REMOVAL: Primary | ICD-10-CM

## 2019-11-14 PROCEDURE — RECHECK: Performed by: DERMATOLOGY

## 2019-11-14 NOTE — PATIENT INSTRUCTIONS
1   SUTURE REMOVAL    Physical Exam:   Anatomic Location Affected:  Mid upper back    Assessment and Plan:  Based on a thorough discussion of this condition and the management approach to it (including a comprehensive discussion of the known risks, side effects and potential benefits of treatment), the patient (family) agrees to implement the following specific plan:   Sutures removed

## 2019-11-14 NOTE — PROGRESS NOTES
[facility] Follow Up Note    Patient Name: Page Resendez  Encounter Date: 11/14/2019    Today's Chief Concerns:  Emaline Folds Concern #1:  Suture removal S/P excision on 10/31/2019         Current Medications:    Current Outpatient Medications:     Cyanocobalamin (VITAMIN B 12 PO), Take 500 mg by mouth daily, Disp: , Rfl:     estradiol-norethindrone (ACTIVELLA) 1-0 5 MG per tablet, Take 1 tablet by mouth daily, Disp: , Rfl:     fexofenadine (ALLEGRA) 180 MG tablet, Take 180 mg by mouth daily as needed, Disp: , Rfl:     fluticasone (FLONASE) 50 mcg/act nasal spray, 1 spray into each nostril daily, Disp: , Rfl:     levothyroxine 88 mcg tablet, Take 88 mcg by mouth daily, Disp: , Rfl:     Magnesium Gluconate 550 MG TABS, Take 1,000 mg by mouth daily, Disp: , Rfl:     metroNIDAZOLE (METROCREAM) 0 75 % cream, Apply topically twice daily, Disp: 45 g, Rfl: 6    pantoprazole (PROTONIX) 40 mg tablet, Take 40 mg by mouth 2 (two) times a day, Disp: , Rfl:     Sulfacetamide Sodium-Sulfur 9 8-4 8 % LIQD, Wash daily, Disp: 1 Bottle, Rfl: 6    olopatadine (PATANOL) 0 1 % ophthalmic solution, Apply 1 drop to eye 2 (two) times a day, Disp: , Rfl:     CONSTITUTIONAL:   Vitals:    11/14/19 1446   Temp: 98 3 °F (36 8 °C)   TempSrc: Tympanic   Weight: 58 1 kg (128 lb)   Height: 5' 2" (1 575 m)         No Known Allergies        PSYCH: Normal mood and affect  EYES: Normal conjunctiva  ENT: Normal lips and oral mucosa  CARDIOVASCULAR: No edema  RESPIRATORY: Normal respirations  HEME/LYMPH/IMMUNO:  No regional lymphadenopathy except as noted below in [address] (SKIN)                          Back/Spine Normal except as noted below in Assessment                   1  SUTURE REMOVAL    Physical Exam:   Anatomic Location Affected:  Mid upper back   Morphological Description:  Well healed    Additional History of Present Condition:  Accession # [id number] from 10/31/2019    Assessment and Plan:  Based on a thorough discussion of this condition and the management approach to it (including a comprehensive discussion of the known risks, side effects and potential benefits of treatment), the patient (family) agrees to implement the following specific plan:   Sutures removed      Scribe Attestation    I,:   Whitney Ford MA am acting as a scribe while in the presence of the attending physician :        I,:   Vera Miles MD personally performed the services described in this documentation    as scribed in my presence :

## 2020-03-03 ENCOUNTER — TELEPHONE (OUTPATIENT)
Dept: DERMATOLOGY | Facility: CLINIC | Age: 60
End: 2020-03-03

## 2020-03-03 DIAGNOSIS — L71.9 ROSACEA: ICD-10-CM

## 2020-03-03 RX ORDER — SULFACETAMIDE SODIUM, SULFUR 98; 48 MG/G; MG/G
LIQUID TOPICAL
Qty: 1 BOTTLE | Refills: 6 | Status: SHIPPED | OUTPATIENT
Start: 2020-03-03

## 2020-03-03 NOTE — TELEPHONE ENCOUNTER
Pt called asking to have a refill of her Plexion from Dr Ryan Geiger  Patient was told that the message would be passed on to the Dr  as soon as we can

## 2020-08-14 ENCOUNTER — TELEPHONE (OUTPATIENT)
Dept: DERMATOLOGY | Facility: CLINIC | Age: 60
End: 2020-08-14

## 2020-08-14 NOTE — TELEPHONE ENCOUNTER
Telephone call from patient to reschedule her 8/25/2020 appointment with Dr Carmen Ag  Per patient she would like to come in the end of September   Patient rescheduled to 09/22/2020 with Dr Carmen Ag in the [facility] office

## 2020-09-22 ENCOUNTER — OFFICE VISIT (OUTPATIENT)
Dept: DERMATOLOGY | Facility: CLINIC | Age: 60
End: 2020-09-22
Payer: COMMERCIAL

## 2020-09-22 VITALS — WEIGHT: 128 LBS | HEIGHT: 62 IN | TEMPERATURE: 98.6 F | BODY MASS INDEX: 23.55 KG/M2

## 2020-09-22 DIAGNOSIS — L82.1 SEBORRHEIC KERATOSIS: ICD-10-CM

## 2020-09-22 DIAGNOSIS — L81.4 LENTIGO: ICD-10-CM

## 2020-09-22 DIAGNOSIS — D48.5 NEOPLASM OF UNCERTAIN BEHAVIOR OF SKIN: Primary | ICD-10-CM

## 2020-09-22 DIAGNOSIS — L57.0 ACTINIC KERATOSIS: ICD-10-CM

## 2020-09-22 PROCEDURE — 17000 DESTRUCT PREMALG LESION: CPT | Performed by: DERMATOLOGY

## 2020-09-22 PROCEDURE — 11102 TANGNTL BX SKIN SINGLE LES: CPT | Performed by: DERMATOLOGY

## 2020-09-22 PROCEDURE — 99214 OFFICE O/P EST MOD 30 MIN: CPT | Performed by: DERMATOLOGY

## 2020-09-22 PROCEDURE — 88305 TISSUE EXAM BY PATHOLOGIST: CPT | Performed by: STUDENT IN AN ORGANIZED HEALTH CARE EDUCATION/TRAINING PROGRAM

## 2020-09-22 RX ORDER — MELOXICAM 7.5 MG/1
7.5 TABLET ORAL DAILY
COMMUNITY
Start: 2020-08-31 | End: 2021-08-31

## 2020-09-22 NOTE — PROGRESS NOTES
[facility] Follow Up Note    Patient Name: Fausto Saba  Encounter Date: 09/22/2020    Today's Chief Concerns:  [facility] Concern #1:  Follow skin exam    Concern #2:  discoloration on forehead    Concern #3:  Skin lesion on side of stomach    Current Medications:    Current Outpatient Medications:     Cyanocobalamin (VITAMIN B 12 PO), Take 500 mg by mouth daily, Disp: , Rfl:     estradiol-norethindrone (ACTIVELLA) 1-0 5 MG per tablet, Take 1 tablet by mouth daily, Disp: , Rfl:     fexofenadine (ALLEGRA) 180 MG tablet, Take 180 mg by mouth daily as needed, Disp: , Rfl:     fluticasone (FLONASE) 50 mcg/act nasal spray, 1 spray into each nostril daily, Disp: , Rfl:     levothyroxine 88 mcg tablet, Take 88 mcg by mouth daily, Disp: , Rfl:     Magnesium Gluconate 550 MG TABS, Take 1,000 mg by mouth daily, Disp: , Rfl:     meloxicam (MOBIC) 7 5 mg tablet, Take 7 5 mg by mouth daily, Disp: , Rfl:     metroNIDAZOLE (METROCREAM) 0 75 % cream, Apply topically twice daily, Disp: 45 g, Rfl: 6    pantoprazole (PROTONIX) 40 mg tablet, Take 40 mg by mouth 2 (two) times a day, Disp: , Rfl:     Sulfacetamide Sodium-Sulfur 9 8-4 8 % LIQD, Wash daily, Disp: 1 Bottle, Rfl: 6    olopatadine (PATANOL) 0 1 % ophthalmic solution, Apply 1 drop to eye 2 (two) times a day, Disp: , Rfl:     CONSTITUTIONAL:   Vitals:    09/22/20 1720   Temp: 98 6 °F (37 °C)   TempSrc: Tympanic   Weight: 58 1 kg (128 lb)   Height: 5' 2" (1 575 m)             Specific Alerts:    Have you been seen by a [facility]ologist in the last 3 years? YES    Are you pregnant or planning to become pregnant? No    Are you currently or planning to be nursing or breast feeding? No    No Known Allergies    May we call your Preferred Phone number to discuss your specific medical information? YES    May we leave a detailed message that includes your specific medical information?  YES    Have you traveled outside of [place] in the past 3 months? No    Do you currently have a pacemaker or defibrillator? No    Do you have any artificial heart valves, joints, plates, screws, rods, stents, pins, etc? No   - If Yes, were any placed within the last 2 years? Do you require any medications prior to a surgical procedure? No   - If Yes, for which procedure? n/a   - If Yes, what medications to you require? N/a     Are you taking any medications that cause you to bleed more easily ("blood thinners") No    Have you ever experienced a rapid heartbeat with epinephrine? No    Have you ever been treated with "gold" (gold sodium thiomalate) therapy? No    [facility] can help with wrinkles, "laugh lines," facial volume loss, "double chin," "love handles," age spots, and more  Are you interested in learning today about some of the skin enhancement procedures that we offer? (If Yes, please provide more detail) No    Review of Systems:  Have you recently had or currently have any of the following?     · Fever or chills: No  · Night Sweats: No  · Headaches: No  · Weight Gain: No  · Weight Loss: No  · Blurry Vision: No  · Nausea: No  · Vomiting: No  · Diarrhea: No  · Blood in Stool: No  · Abdominal Pain: No  · Itchy Skin: No  · Painful Joints: No  · Swollen Joints: No  · Muscle Pain: No  · Irregular Mole: No  · Sun Burn: No  · Dry Skin: No  · Skin Color Changes: YES  · Scar or Keloid: No  · Cold Sores/Fever Blisters: No  · Bacterial Infections/MRSA: No  · Anxiety: No  · Depression: No  · Suicidal or Homicidal Thoughts: No      PSYCH: Normal mood and affect  EYES: Normal conjunctiva  ENT: Normal lips and oral mucosa  CARDIOVASCULAR: No edema  RESPIRATORY: Normal respirations  HEME/LYMPH/IMMUNO:  No regional lymphadenopathy except as noted below in ASSESSMENT AND PLAN BY DIAGNOSIS    FULL ORGAN SYSTEM SKIN EXAM (SKIN)   Hair, Scalp, Ears, Face Normal except as noted below in Assessment   Neck, Cervical Chain Nodes Normal except as noted below in Assessment   Right Arm/Hand/Fingers Normal except as noted below in Assessment   Left Arm/Hand/Fingers Normal except as noted below in Assessment   Chest/Breasts/Axillae Viewed areas Normal except as noted below in Assessment   Abdomen, Umbilicus Normal except as noted below in Assessment   Back/Spine Normal except as noted below in Assessment   Groin/Genitalia/Buttocks Viewed areas Normal except as noted below in Assessment   Right Leg, Foot, Toes Normal except as noted below in Assessment   Left Leg, Foot, Toes Normal except as noted below in Assessment           1  ACTINIC KERATOSIS    Physical Exam:   Anatomic Location Affected:  Right temple   Morphological Description:  Scaly pink plaque     Additional History of Present Condition:  Present for     Assessment and Plan:  Based on a thorough discussion of this condition and the management approach to it (including a comprehensive discussion of the known risks, side effects and potential benefits of treatment), the patient (family) agrees to implement the following specific plan:    Obtained consent form patient to perform cryotherapy today  Liquid nitrogen was applied for 10-12 seconds to the skin lesion and the expected blistering or scabbing reaction explained  Do not pick at the area  Patient reminded to expect hypopigmented scars from the procedure  Return if lesion fails to fully resolve  PROCEDURE:  DESTRUCTION OF BENIGN LESIONS  After a thorough discussion of treatment options and risk/benefits/alternatives (including but not limited to local pain, scarring, dyspigmentation, blistering, and possible superinfection), verbal and written consent were obtained and the aforementioned lesions were treated on with cryotherapy using liquid nitrogen x 1 cycle for 5-10 seconds  TOTAL NUMBER of 1 lesions were treated today on the ANATOMIC LOCATION: right temple       The patient tolerated the procedure well, and after-care instructions were provided  Actinic keratoses are very common on sites repeatedly exposed to the sun, especially the backs of the hands and the face, most often affecting the ears, nose, cheeks, upper lip, vermilion of the lower lip, temples, forehead and balding scalp  In severely chronically sun-damaged individuals, they may also be found on the upper trunk, upper and lower limbs, and dorsum of feet  We discussed the theoretical premalignant (pre-cancerous) nature and etiology of these growths  We discussed the prevailing notion that actinic keratoses are a reflection of abnormal skin cell development due to DNA damage by short wavelength UVB  They are more likely to appear if the immune function is poor, due to aging, recent sun exposure, predisposing disease or certain drugs  We discussed that the main concern is that actinic keratoses may predispose to squamous cell carcinoma  It is rare for a solitary actinic keratosis to evolve to squamous cell carcinoma (SCC), but the risk of SCC occurring at some stage in a patient with more than 10 actinic keratoses is thought to be about 10 to 15%  A tender, thickened, ulcerated or enlarging actinic keratosis is suspicious of SCC  Actinic keratoses may be prevented by strict sun protection  If already present, keratoses may improve with a very high sun protection factor (50+) broad-spectrum sunscreen applied at least daily to affected areas, year-round  We recommend that UPF-rated clothing and hats and sunglasses be worn whenever possible and that a sunscreen-moisturizer combination product such as Neutrogena Daily Defense be applied at least three times a day      We performed a thorough discussion of treatment options and specific risk/benefits/alternatives including but not limited to medical field treatment with medications such as the following:         Cryotherapy (specifically, local pain, scarring, dyspigmentation, blistering, possible superinfection, and treats TYSTBERGA what we see versus directed treatment today)  2  LENTIGO    Physical Exam:   Anatomic Location Affected: Face    Morphological Description:  Light tan 2-4 scattered macules   Pertinent Positives:   Pertinent Negatives: Additional History of Present Condition:  Present for years  Assessment and Plan:  Based on a thorough discussion of this condition and the management approach to it (including a comprehensive discussion of the known risks, side effects and potential benefits of treatment), the patient (family) agrees to implement the following specific plan:   Recommend patient to start using over the counter Differin  Start using Differin Gel Spread one pea-sized amount of medication over entire face about one hour before bedtime          What is a lentigo? A lentigo is a pigmented flat or slightly raised lesion with a clearly defined edge  Unlike an ephelis (freckle), it does not fade in the winter months  There are several kinds of lentigo  The name lentigo originally referred to its appearance resembling a small lentil  The plural of lentigo is lentigines, although lentigos is also in common use  Who gets lentigines? Lentigines can affect males and females of all ages and races  Solar lentigines are especially prevalent in fair skinned adults  Lentigines associated with syndromes are present at birth or arise during childhood  What causes lentigines? Common forms of lentigo are due to exposure to ultraviolet radiation:   Sun damage including sunburn    Indoor tanning    Phototherapy, especially photochemotherapy (PUVA)    Ionizing radiation, eg radiation therapy, can also cause lentigines  Several familial syndromes associated with widespread lentigines originate from mutations in Sukhi-MAP kinase, mTOR signaling and PTEN pathways  What are the clinical features of lentigines?   Lentigines have been classified into several different types depending on what they look like, where they appear on the body, causative factors, and whether they are associated to other diseases or conditions  Lentigines may be solitary or more often, multiple  Most lentigines are smaller than 5 mm in diameter      Lentigo simplex   A precursor to junctional naevus    Arises during childhood and early adult life    Found on trunk and limbs    Small brown round or oval macule or thin plaque    Jagged or smooth edge    May have a dry surface    May disappear in time  Solar lentigo   A precursor to seborrhoeic keratosis    Found on chronically sun exposed sites such as hands, face, lower legs    May also follow sunburn to shoulders    Yellow, light or dark brown regular or irregular macule or thin plaque    May have a dry surface    Often has moth-eaten outline    Can slowly enlarge to several centimeters in diameter    May disappear, often through the process known as lichenoid keratosis    When atypical in appearance, may be difficult to distinguish from melanoma in situ  Ink spot lentigo   Also known as reticulated lentigo    Few in number compared to solar lentigines    Follows sunburn in very fair skinned individuals    Dark brown to black irregular ink spot-like macule  PUVA lentigo   Similar to ink spot lentigo but follows photochemotherapy (PUVA)    Location anywhere exposed to PUVA  Tanning bed lentigo   Similar to ink spot lentigo but follows indoor tanning    Location anywhere exposed to tanning bed  Radiation lentigo   Occurs in site of irradiation (accidental or therapeutic)    Associated with late-stage radiation dermatitis: epidermal atrophy, subcutaneous fibrosis, keratosis, telangiectasias  Melanotic macule   Mucosal surfaces or adjacent glabrous skin eg lip, vulva, penis, anus    Light to dark brown    Also called mucosal melanosis  Generalised lentigines   Found on any exposed or covered site from early childhood    Small macules may merge to form larger patches    Not associated with a syndrome    Also called lentigines profusa, multiple lentigines  Agminated lentigines   Naevoid eruption of lentigos confined to a single segmental area    Sharp demarcation in midline    May have associated neurological and developmental abnormalities  Patterned lentigines   Inherited tendency to lentigines on face, lips, buttocks, palms, soles    Recognised mainly in people of  ethnicity  Centrofacial neurodysraphic lentiginosis  Associated with mental retardation  Lentiginosis syndromes   Syndromes include LEOPARD/Maria Del Carmen, Peutz-Jeghers, Laugier-Hunziker, Moynahan, Xeroderma pigmentosum, myxoma syndromes (MONIQUE, NAME, [place]), Ruvalcaba-Myhre-Medina, Bannayan-Zonnana syndrome, Cowden disease (multiple hamartoma syndrome )    Inheritance is autosomal dominant; sporadic cases common    Widespread lentigines present at birth or arise in early childhood    Associated with neural, endocrine, and mesenchymal tumors    How is the diagnosis made? Lentigines are usually diagnosed clinically by their typical appearance  Concern regarding possibility of melanoma may lead to:   Dermatoscopy    Diagnostic excision biopsy    Histopathology of a lentigo shows:   Thickened epidermis    An increased number of melanocytes along the basal layer of epidermis    Unlike junctional melanocytic naevus, there are no nests of melanocytes    Increased melanin pigment within the keratinocytes    Additional features depending on type of lentigo    In contrast, an ephelis (freckle) shows sun-induced increased melanin within the keratinocytes, without an increase in number of cells  What is the treatment for lentigines? Most lentigines are left alone  Attempts to lighten them may not be successful   The following approaches are used:   SPF 50+ broad-spectrum sunscreen    Hydroquinone bleaching cream    Alpha hydroxy acids    Vitamin C    Retinoids    Azelaic acid  Chemical peels  Individual lesions can be permanently removed using:   Cryotherapy    Intense pulsed light    Pigment lasers    How can lentigines be prevented? Lentigines associated with exposure ultraviolet radiation can be prevented by very careful sun protection  Clothing is more successful at preventing new lentigines than are sunscreens  What is the outlook for lentigines? Lentigines usually persist  They may increase in number with age and sun exposure  Some in sun-protected sites may fade and disappear  3  SEBORRHEIC KERATOSIS; NON-INFLAMED    Physical Exam:   Anatomic Location Affected:  Trunk and extremities    Morphological Description:  Flat and raised, waxy, smooth to warty textured, yellow to brownish-grey to dark brown to blackish, discrete, "stuck-on" appearing papules   Pertinent Positives:   Pertinent Negatives: Additional History of Present Condition:  Patient reports new bumps on the skin  Denies itch, burn, pain, bleeding or ulceration  Present constantly; nothing seems to make it worse or better  No prior treatment  Assessment and Plan:  Based on a thorough discussion of this condition and the management approach to it (including a comprehensive discussion of the known risks, side effects and potential benefits of treatment), the patient (family) agrees to implement the following specific plan:   Reassure benign  Seborrheic Keratosis  A seborrheic keratosis is a harmless warty spot that appears during adult life as a common sign of skin aging  Seborrheic keratoses can arise on any area of skin, covered or uncovered, with the usual exception of the palms and soles  They do not arise from mucous membranes  Seborrheic keratoses can have highly variable appearance  Seborrheic keratoses are extremely common  It has been estimated that over 90% of adults over the age of 61 years have one or more of them   They occur in males and females of all races, typically beginning to erupt in the 35s or 45s  They are uncommon under the age of 21 years  The precise cause of seborrhoeic keratoses is not known  Seborrhoeic keratoses are considered degenerative in nature  As time goes by, seborrheic keratoses tend to become more numerous  Some people inherit a tendency to develop a very large number of them; some people may have hundreds of them  The name "seborrheic keratosis" is misleading, because these lesions are not limited to a seborrhoeic distribution (scalp, mid-face, chest, upper back), nor are they formed from sebaceous glands, nor are they associated with sebum -- which is greasy  Seborrheic keratosis may also be called "SK," "Seb K," "basal cell papilloma," "senile wart," or "barnacle "      Researchers have noted:   Eruptive seborrhoeic keratoses can follow sunburn or dermatitis   Skin friction may be the reason they appear in body folds   Viral cause (e g , human papillomavirus) seems unlikely   Stable and clonal mutations or activation of FRFR3, PIK3CA, RILEY, AKT1 and EGFR genes are found in seborrhoeic keratoses   Seborrhoeic keratosis can arise from solar lentigo   FRFR3 mutations also arise in solar lentigines  These mutations are associated with increased age and location on the head and neck, suggesting a role of ultraviolet radiation in these lesions   Seborrheic keratoses do not harbour tumour suppressor gene mutations   Epidermal growth factor receptor inhibitors, which are used to treat some cancers, often result in an increase in verrucal (warty) keratoses  There is no easy way to remove multiple lesions on a single occasion  Unless a specific lesion is "inflamed" and is causing pain or stinging/burning or is bleeding, most insurance companies do not authorize treatment      4  NEOPLASM OF UNCERTAIN BEHAVIOR OF SKIN    Physical Exam:   (Anatomic Location); (Size and Morphological Description); (Differential Diagnosis):  o ATTENTION:    Kurt Hsieh  o   o Relevant Clinical History:  o Patient is 60years old with history of Basal Cell Carcinoma  o   o Specimen Letter: A  o Specimen Type:   o Skin  o Anatomical Location:  o Right Torso   o Procedure Type: Shave Removal  o Size of Lesion:   o 0 5cm brown to black papule   o Margins (if any):None   o Morphological Description (including dermoscopy findings):   o 0 5cm brown to black papule   o Differential Diagnosis/Specific Clinical Question:   o Pigmented seborrheic keratosis versus melanocytic lesion  o Association with previous path ([facility] Accession #): None   o Tagged with suture at following position: None   Pertinent Positives:   Pertinent Negatives: Additional History of Present Condition:  Present for years  Assessment and Plan:   I have discussed with the patient that a sample of skin via a "skin biopsy would be potentially helpful to further make a specific diagnosis under the microscope   Based on a thorough discussion of this condition and the management approach to it (including a comprehensive discussion of the known risks, side effects and potential benefits of treatment), the patient (family) agrees to implement the following specific plan:    o Procedure:  Skin Biopsy  After a thorough discussion of treatment options and risk/benefits/alternatives (including but not limited to local pain, scarring, dyspigmentation, blistering, possible superinfection, and inability to confirm a diagnosis via histopathology), verbal and written consent were obtained and portion of the rash was biopsied for tissue sample  See below for consent that was obtained from patient and subsequent Procedure Note      PROCEDURE SHAVE BIOPSY NOTE:     Performing Physician: Dr Gregory   Anatomic Location; Clinical Description with size (cm); Pre-Op Diagnosis:   o ATTENTION:  Dr Kurt Hsieh  o   o Relevant Clinical History:  o Patient is 60years old with history of Basal Cell Carcinoma  o   o Specimen Letter: A  o Specimen Type:   o Skin  o Anatomical Location:  o Right Torso   o Procedure Type: Shave Removal  o Size of Lesion:   o 0 5cm brown to black papule   o Margins (if any):None   o Morphological Description (including dermoscopy findings):   o 0 5cm brown to black papule   o Differential Diagnosis/Specific Clinical Question:   o Pigmented seborrheic keratosis (favored) versus melanocytic lesion  o Association with previous path ([facility] Accession #): None   o Tagged with suture at following position: None   Post-op diagnosis: Same      Local anesthesia: 1% xylocaine with epi       Topical anesthesia: None     Hemostasis: Aluminum chloride       After obtaining informed consent  at which time there was a discussion about the purpose of biopsy  and low risks of infection and bleeding  The area was prepped and draped in the usual fashion  Anesthesia was obtained with 1% lidocaine with epinephrine  A shave biopsy to an appropriate sampling depth was obtained with a sterile blade (such as a 15-blade or DermaBlade)  The resulting wound was covered with surgical ointment and bandaged appropriately  The patient tolerated the procedure well without complications and was without signs of functional compromise  Specimen has been sent for review by Dermatopathology  Standard post-procedure care has been explained and has been included in written form within the patient's copy of Informed Consent  INFORMED CONSENT DISCUSSION AND POST-OPERATIVE INSTRUCTIONS FOR PATIENT    I   RATIONALE FOR PROCEDURE  I understand that a skin biopsy allows the Dermatologist to test a lesion or rash under the microscope to obtain a diagnosis  It usually involves numbing the area with numbing medication and removing a small piece of skin; sometimes the area will be closed with sutures  In this specific procedure, sutures are not usually needed    If any sutures are placed, then they are usually need to be removed in 2 weeks or less  I understand that my Dermatologist recommends that a skin "shave" biopsy be performed today  A local anesthetic, similar to the kind that a dentist uses when filling a cavity, will be injected with a very small needle into the skin area to be sampled  The injected skin and tissue underneath "will go to sleep and become numb so no pain should be felt afterwards  An instrument shaped like a tiny "razor blade" (shave biopsy instrument) will be used to cut a small piece of tissue and skin from the area so that a sample of tissue can be taken and examined more closely under the microscope  A slight amount of bleeding will occur, but it will be stopped with direct pressure and a pressure bandage and any other appropriate methods  I understands that a scar will form where the wound was created  Surgical ointment will be applied to help protect the wound  Sutures are not usually needed  II   RISKS AND POTENTIAL COMPLICATIONS   I understand the risks and potential complications of a skin biopsy include but are not limited to the following:   Bleeding   Infection   Pain   Scar/keloid   Skin discoloration   Incomplete Removal   Recurrence   Nerve Damage/Numbness/Loss of Function   Allergic Reaction to Anesthesia   Biopsies are diagnostic procedures and based on findings additional treatment or evaluation may be required   Loss or destruction of specimen resulting in no additional findings    My Dermatologist has explained to me the nature of the condition, the nature of the procedure, and the benefits to be reasonably expected compared with alternative approaches  My Dermatologist has discussed the likelihood of major risks or complications of this procedure including the specific risks listed above, such as bleeding, infection, and scarring/keloid  I understand that a scar is expected after this procedure    I understand that my physician cannot predict if the scar will form a "keloid," which extends beyond the borders of the wound that is created  A keloid is a thick, painful, and bumpy scar  A keloid can be difficult to treat, as it does not always respond well to therapy, which includes injecting cortisone directly into the keloid every few weeks  While this usually reduces the pain and size of the scar, it does not eliminate it  I understand that photographs may be taken before and after the procedure  These will be maintained as part of the medical providers confidential records and may not be made available to me  I further authorize the medical provider to use the photographs for teaching purposes or to illustrate scientific papers, books, or lectures if in his/her judgment, medical research, education, or science may benefit from its use  I have had an opportunity to fully inquire about the risks and benefits of this procedure and its alternatives  I have been given ample time and opportunity to ask questions and to seek a second opinion if I wished to do so  I acknowledge that there have specifically been no guarantees as to the cosmetic results from the procedure  I am aware that with any procedure there is always the possibility of an unexpected complication  III  POST-PROCEDURAL CARE (WHAT YOU WILL NEED TO DO "AFTER THE BIOPSY" TO OPTIMIZE HEALING)     Keep the area clean and dry  Try NOT to remove the bandage or get it wet for the first 24 hours   Gently clean the area and apply surgical ointment (such as Vaseline petrolatum ointment, which is available "over the counter" and not a prescription) to the biopsy site for up to 2 weeks straight  This acts to protect the wound from the outside world   Sutures are not usually placed in this procedure  If any sutures were placed, return for suture removal as instructed (generally 1 week for the face, 2 weeks for the body)         Take Acetaminophen (Tylenol) for discomfort, if no contraindications  Ibuprofen or aspirin could make bleeding worse   Call our office immediately for signs of infection: fever, chills, increased redness, warmth, tenderness, discomfort/pain, or pus or foul smell coming from the wound  WHAT TO DO IF THERE IS ANY BLEEDING? If a small amount of bleeding is noticed, place a clean cloth over the area and apply firm pressure for ten minutes  Check the wound after 10 minutes of direct pressure  If bleeding persists, try one more time for an additional 10 minutes of direct pressure on the area  If the bleeding becomes heavier or does not stop after the second attempt, or if you have any other questions about this procedure, then please call your [facility]'s Dermatologist by calling [phone] (SKIN)  I hereby acknowledge that I have reviewed and verified the site with my Dermatologist and have requested and authorized my Dermatologist to proceed with the procedure                Scribe Attestation    I,:   Larry Solis MA am acting as a scribe while in the presence of the attending physician :        I,:   Maxim Chamberlain MD personally performed the services described in this documentation    as scribed in my presence :

## 2020-09-24 NOTE — RESULT ENCOUNTER NOTE
DERMATOPATHOLOGY RESULT NOTE    Results reviewed by ordering physician  Called patient to personally discuss results  Discussed results with patient  Instructions for Clinical Derm Team:   (remember to route Result Note to appropriate staff):    None    Result & Plan by Specimen:    Specimen A: benign  Plan: reassured, benign      Surgical Pathology Report                         Case: [id number]                                    Authorizing Provider: Zafar Lund MD          Collected:           09/22/2020 1726               Ordering Location:     [facility]      Received:            09/22/2020 1727                                    [address] [place] [address]                                                                 Pathologist:           Gaetano Yousif MD                                                            Specimen:    Skin, Other, A; right torso                                                               Final Diagnosis   A  Skin, right torso, shave biopsy:     SEBORRHEIC KERATOSIS with adjacent COMPOUND NEVUS; extending to the tissue edges         Electronically signed by Gaetano Yousif MD on 9/24/2020 at  3:23 PM   Additional Information    All reported additional testing was performed with appropriately reactive controls   These tests were developed and their performance characteristics determined by [address] [place] [address] [facility] or appropriate performing facility, though some tests may be performed on tissues which have not been validated for performance characteristics (such as staining performed on alcohol exposed cell blocks and decalcified tissues)   Results should be interpreted with caution and in the context of the patients' clinical condition  These tests may not be cleared or approved by the [place]  Food and Drug Administration, though the FDA has determined that such clearance or approval is not necessary   These tests are used for clinical purposes and they should not be regarded as investigational or for research  This laboratory has been approved by [place] [employer], designated as a high-complexity laboratory and is qualified to perform these tests  Dinh Hobbs Description    A  The specimen is received in formalin, labeled with the patient's name and hospital number, and is designated "right torso"  The specimen consists of a 0 8 x 0 5 x 0 1 cm shave biopsy of white skin  The epithelial surface exhibits a brown hyperpigmented macule measuring 0 6 x 0 5 cm in greatest dimensions  Also the epithelial surface consists of a 0 3 x 0 2 x 0 1 cm papule  The epithelial surface is inked red and the margin of resection is inked green  The specimen is bisected revealing grossly unremarkable cut surfaces  The specimen is entirely submitted between sponges, 1 cassette      Note: The estimated total formalin fixation time based upon information provided by the submitting clinician and the standard processing schedule is under 72 hours      Erica       Clinical Information    ATTENTION:  Dr Elroy Eubanks     Relevant Clinical History:   Patient is 60years old with history of Basal Cell Carcinoma  Specimen Letter: A   Specimen Type:   Skin   Anatomical Location:   Right Torso   Procedure Type: Shave Removal   Size of Lesion:   0 5cm brown to black papule   Margins (if any):None   Morphological Description (including dermoscopy findings):   0 5cm brown to black papule   Differential Diagnosis/Specific Clinical Question:   Pigmented seborrheic keratosis versus melanocytic lesion     Association with previous path ([facility]): None   Tagged with suture at following position: None

## 2021-03-23 ENCOUNTER — OFFICE VISIT (OUTPATIENT)
Dept: DERMATOLOGY | Facility: CLINIC | Age: 61
End: 2021-03-23
Payer: COMMERCIAL

## 2021-03-23 VITALS — BODY MASS INDEX: 24.29 KG/M2 | WEIGHT: 132 LBS | TEMPERATURE: 98.3 F | HEIGHT: 62 IN

## 2021-03-23 DIAGNOSIS — D22.60 MULTIPLE BENIGN MELANOCYTIC NEVI OF UPPER EXTREMITY, LOWER EXTREMITY, AND TRUNK: ICD-10-CM

## 2021-03-23 DIAGNOSIS — D22.70 MULTIPLE BENIGN MELANOCYTIC NEVI OF UPPER EXTREMITY, LOWER EXTREMITY, AND TRUNK: ICD-10-CM

## 2021-03-23 DIAGNOSIS — D22.5 MULTIPLE BENIGN MELANOCYTIC NEVI OF UPPER EXTREMITY, LOWER EXTREMITY, AND TRUNK: ICD-10-CM

## 2021-03-23 DIAGNOSIS — Q82.5 PORT-WINE STAIN OF SKIN: ICD-10-CM

## 2021-03-23 DIAGNOSIS — L81.4 LENTIGO: ICD-10-CM

## 2021-03-23 DIAGNOSIS — Z85.828 HISTORY OF BASAL CELL CARCINOMA: Primary | ICD-10-CM

## 2021-03-23 PROCEDURE — 99213 OFFICE O/P EST LOW 20 MIN: CPT | Performed by: DERMATOLOGY

## 2021-03-23 NOTE — PATIENT INSTRUCTIONS
1  HISTORY OF BASAL CELL CARCINOMA        Assessment and Plan:  Based on a thorough discussion of this condition and the management approach to it (including a comprehensive discussion of the known risks, side effects and potential benefits of treatment), the patient (family) agrees to implement the following specific plan:   Recommend patient to follow up every 6 months to yearly for skin exam    Continue using sun protective clothing like long sleeves, long pants and a hat of 6 inches of diameter and an SPF 30+ daily when out in the sun  How can basal cell carcinoma be prevented? The most important way to prevent BCC is to avoid sunburn  This is especially important in childhood and early life  Fair skinned individuals and those with a personal or family history of BCC should protect their skin from sun exposure daily, year-round and lifelong   Stay indoors or under the shade in the middle of the day    Wear covering clothing    Apply high protection factor SPF50+ broad-spectrum sunscreens generously to exposed skin if outdoors    Avoid indoor tanning (sun beds, solaria)   Oral nicotinamide (vitamin B3) in a dose of 500 mg twice daily may reduce the number and severity of BCCs  What is the outlook for basal cell carcinoma? Most BCCs are cured by treatment  Cure is most likely if treatment is undertaken when the lesion is small  About 50% of people with BCC develop a second one within 3 years of the first  They are also at increased risk of other skin cancers, especially melanoma  Regular self-skin examinations and long-term annual skin checks by an experienced health professional are recommended          2  MELANOCYTIC NEVI ("Moles")        Assessment and Plan:  Based on a thorough discussion of this condition and the management approach to it (including a comprehensive discussion of the known risks, side effects and potential benefits of treatment), the patient (family) agrees to implement the following specific plan:   Reassure Benign   Monitor for any changes in size shape or color  Melanocytic Nevi  Melanocytic nevi ("moles") are tan or brown, raised or flat areas of the skin which have an increased number of melanocytes  Melanocytes are the cells in our body which make pigment and account for skin color  Some moles are present at birth (I e , "congenital nevi"), while others come up later in life (i e , "acquired nevi")  The sun can stimulate the body to make more moles  Sunburns are not the only thing that triggers more moles  Chronic sun exposure can do it too  Clinically distinguishing a healthy mole from melanoma may be difficult, even for experienced dermatologists  The "ABCDE's" of moles have been suggested as a means of helping to alert a person to a suspicious mole and the possible increased risk of melanoma  The suggestions for raising alert are as follows:    Asymmetry: Healthy moles tend to be symmetric, while melanomas are often asymmetric  Asymmetry means if you draw a line through the mole, the two halves do not match in color, size, shape, or surface texture  Asymmetry can be a result of rapid enlargement of a mole, the development of a raised area on a previously flat lesion, scaling, ulceration, bleeding or scabbing within the mole  Any mole that starts to demonstrate "asymmetry" should be examined promptly by a board certified dermatologist      Border: Healthy moles tend to have discrete, even borders  The border of a melanoma often blends into the normal skin and does not sharply delineate the mole from normal skin  Any mole that starts to demonstrate "uneven borders" should be examined promptly by a board certified dermatologist      Color: Healthy moles tend to be one color throughout  Melanomas tend to be made up of different colors ranging from dark black, blue, white, or red    Any mole that demonstrates a color change should be examined promptly by a board certified dermatologist      Diameter: Healthy moles tend to be smaller than 0 6 cm in size; an exception are "congenital nevi" that can be larger  Melanomas tend to grow and can often be greater than 0 6 cm (1/4 of an inch, or the size of a pencil eraser)  This is only a guideline, and many normal moles may be larger than 0 6 cm without being unhealthy  Any mole that starts to change in size (small to bigger or bigger to smaller) should be examined promptly by a board certified dermatologist      Evolving: Healthy moles tend to "stay the same "  Melanomas may often show signs of change or evolution such as a change in size, shape, color, or elevation  Any mole that starts to itch, bleed, crust, burn, hurt, or ulcerate or demonstrate a change or evolution should be examined promptly by a board certified dermatologist       Dysplastic Nevi  Dysplastic moles are moles that fit the ABCDE rules of melanoma but are not identified as melanomas when examined under the microscope  They may indicate an increased risk of melanoma in that person  If there is a family history of melanoma, most experts agree that the person may be at an increased risk for developing a melanoma  Experts still do not agree on what dysplastic moles mean in patients without a personal or family history of melanoma  Dysplastic moles are usually larger than common moles and have different colors within it with irregular borders  The appearance can be very similar to a melanoma  Biopsies of dysplastic moles may show abnormalities which are different from a regular mole  Melanoma  Malignant melanoma is a type of skin cancer that can be deadly if it spreads throughout the body  The incidence of melanoma in the [place] is growing faster than any other cancer  Melanoma usually grows near the surface of the skin for a period of time, and then begins to grow deeper into the skin   Once it grows deeper into the skin, the risk of spread to other organs greatly increases  Therefore, early detection and removal of a malignant melanoma may result in a better chance at a complete cure; removal after the tumor has spread may not be as effective, leading to worse clinical outcomes such as death  The true rate of nevus transformation into a melanoma is unknown  It has been estimated that the lifetime risk for any acquired melanocytic nevus on any 21year-old individual transforming into melanoma by age [de-identified] is 0 03% (1 in 3,164) for men and 0 009% (1 in 10,800) for women  The appearance of a "new mole" remains one of the most reliable methods for identifying a malignant melanoma  Occasionally, melanomas appear as rapidly growing, blue-black, dome-shaped bumps within a previous mole or previous area of normal skin  Other times, melanomas are suspected when a mole suddenly appears or changes  Itching, burning, or pain in a pigmented lesion should increase suspicion, but most patients with early melanoma have no skin discomfort whatsoever  Melanoma can occur anywhere on the skin, including areas that are difficult for self-examination  Many melanomas are first noticed by other family members  Suspicious-looking moles may be removed for microscopic examination  You may be able to prevent death from melanoma by doing two simple things:    1  Try to avoid unnecessary sun exposure and protect your skin when it is exposed to the sun  People who live near the equator, people who have intermittent exposures to large amounts of sun, and people who have had sunburns in childhood or adolescence have an increased risk for melanoma  Sun sense and vigilant sun protection may be keys to helping to prevent melanoma    We recommend wearing UPF-rated sun protective clothing and sunglasses whenever possible and applying a moisturizer-sunscreen combination product (SPF 50+) such as Neutrogena Daily Defense to sun exposed areas of skin at least three times a day     2  Have your moles regularly examined by a board certified dermatologist AND by yourself or a family member/friend at home  We recommend that you have your moles examined at least once a year by a board certified dermatologist   Use your birthday as an annual reminder to have your "Birthday Suit" (I e , your skin) examined; it is a nice birthday gift to yourself to know that your skin is healthy appearing! Additionally, at-home self examinations may be helpful for detecting a possible melanoma  Use the ABCDEs we discussed and check your moles once a month at home  3 PORTWINE STAIN       Assessment and Plan:  Based on a thorough discussion of this condition and the management approach to it (including a comprehensive discussion of the known risks, side effects and potential benefits of treatment), the patient (family) agrees to implement the following specific plan:   Reassure Benign  4  LENTIGO        Assessment and Plan:  Based on a thorough discussion of this condition and the management approach to it (including a comprehensive discussion of the known risks, side effects and potential benefits of treatment), the patient (family) agrees to implement the following specific plan:   Reassure benign  Start using Neutrogena daily defense or Cerave AM 3 times daily for sun protection              What is a lentigo? A lentigo is a pigmented flat or slightly raised lesion with a clearly defined edge  Unlike an ephelis (freckle), it does not fade in the winter months  There are several kinds of lentigo  The name lentigo originally referred to its appearance resembling a small lentil  The plural of lentigo is lentigines, although lentigos is also in common use  Who gets lentigines? Lentigines can affect males and females of all ages and races  Solar lentigines are especially prevalent in fair skinned adults   Lentigines associated with syndromes are present at birth or arise during childhood  What causes lentigines? Common forms of lentigo are due to exposure to ultraviolet radiation:   Sun damage including sunburn    Indoor tanning    Phototherapy, especially photochemotherapy (PUVA)    Ionizing radiation, eg radiation therapy, can also cause lentigines  Several familial syndromes associated with widespread lentigines originate from mutations in Sukhi-MAP kinase, mTOR signaling and PTEN pathways  What are the clinical features of lentigines? Lentigines have been classified into several different types depending on what they look like, where they appear on the body, causative factors, and whether they are associated to other diseases or conditions  Lentigines may be solitary or more often, multiple  Most lentigines are smaller than 5 mm in diameter      Lentigo simplex   A precursor to junctional naevus    Arises during childhood and early adult life    Found on trunk and limbs    Small brown round or oval macule or thin plaque    Jagged or smooth edge    May have a dry surface    May disappear in time  Solar lentigo   A precursor to seborrhoeic keratosis    Found on chronically sun exposed sites such as hands, face, lower legs    May also follow sunburn to shoulders    Yellow, light or dark brown regular or irregular macule or thin plaque    May have a dry surface    Often has moth-eaten outline    Can slowly enlarge to several centimeters in diameter    May disappear, often through the process known as lichenoid keratosis    When atypical in appearance, may be difficult to distinguish from melanoma in situ  Ink spot lentigo   Also known as reticulated lentigo    Few in number compared to solar lentigines    Follows sunburn in very fair skinned individuals    Dark brown to black irregular ink spot-like macule  PUVA lentigo   Similar to ink spot lentigo but follows photochemotherapy (PUVA)    Location anywhere exposed to PUVA  Tanning bed lentigo   Similar to ink spot lentigo but follows indoor tanning    Location anywhere exposed to tanning bed  Radiation lentigo   Occurs in site of irradiation (accidental or therapeutic)    Associated with late-stage radiation dermatitis: epidermal atrophy, subcutaneous fibrosis, keratosis, telangiectasias  Melanotic macule   Mucosal surfaces or adjacent glabrous skin eg lip, vulva, penis, anus    Light to dark brown    Also called mucosal melanosis  Generalised lentigines   Found on any exposed or covered site from early childhood    Small macules may merge to form larger patches    Not associated with a syndrome    Also called lentigines profusa, multiple lentigines  Agminated lentigines   Naevoid eruption of lentigos confined to a single segmental area    Sharp demarcation in midline    May have associated neurological and developmental abnormalities  Patterned lentigines   Inherited tendency to lentigines on face, lips, buttocks, palms, soles    Recognised mainly in people of  ethnicity  Centrofacial neurodysraphic lentiginosis  Associated with mental retardation  Lentiginosis syndromes   Syndromes include LEOPARD/Maria Del Carmen, Peutz-Jeghers, Laugier-Hunziker, Moynahan, Xeroderma pigmentosum, myxoma syndromes (MONIQUE, NAME, [place]), Ruvalcaba-Myhre-Medina, Bannayan-Zonnana syndrome, Cowden disease (multiple hamartoma syndrome )    Inheritance is autosomal dominant; sporadic cases common    Widespread lentigines present at birth or arise in early childhood    Associated with neural, endocrine, and mesenchymal tumors    How is the diagnosis made? Lentigines are usually diagnosed clinically by their typical appearance   Concern regarding possibility of melanoma may lead to:   Dermatoscopy    Diagnostic excision biopsy    Histopathology of a lentigo shows:   Thickened epidermis    An increased number of melanocytes along the basal layer of epidermis    Unlike junctional melanocytic naevus, there are no nests of melanocytes    Increased melanin pigment within the keratinocytes    Additional features depending on type of lentigo    In contrast, an ephelis (freckle) shows sun-induced increased melanin within the keratinocytes, without an increase in number of cells  What is the treatment for lentigines? Most lentigines are left alone  Attempts to lighten them may not be successful  The following approaches are used:   SPF 50+ broad-spectrum sunscreen    Hydroquinone bleaching cream    Alpha hydroxy acids    Vitamin C    Retinoids    Azelaic acid    Chemical peels  Individual lesions can be permanently removed using:   Cryotherapy    Intense pulsed light    Pigment lasers    How can lentigines be prevented? Lentigines associated with exposure ultraviolet radiation can be prevented by very careful sun protection  Clothing is more successful at preventing new lentigines than are sunscreens  What is the outlook for lentigines? Lentigines usually persist  They may increase in number with age and sun exposure  Some in sun-protected sites may fade and disappear

## 2021-03-23 NOTE — PROGRESS NOTES
[facility] Follow Up Note    Patient Name: Nalini Dixon  Encounter Date: 03/23/2021    Today's Chief Concerns:  Ana [place] Concern #1:  Full body skin exam       Current Medications:    Current Outpatient Medications:     Cyanocobalamin (VITAMIN B 12 PO), Take 500 mg by mouth daily, Disp: , Rfl:     estradiol-norethindrone (ACTIVELLA) 1-0 5 MG per tablet, Take 1 tablet by mouth daily, Disp: , Rfl:     fexofenadine (ALLEGRA) 180 MG tablet, Take 180 mg by mouth daily as needed, Disp: , Rfl:     fluticasone (FLONASE) 50 mcg/act nasal spray, 1 spray into each nostril daily, Disp: , Rfl:     levothyroxine 88 mcg tablet, Take 88 mcg by mouth daily, Disp: , Rfl:     Magnesium Gluconate 550 MG TABS, Take 1,000 mg by mouth daily, Disp: , Rfl:     meloxicam (MOBIC) 7 5 mg tablet, Take 7 5 mg by mouth daily, Disp: , Rfl:     metroNIDAZOLE (METROCREAM) 0 75 % cream, Apply topically twice daily, Disp: 45 g, Rfl: 6    pantoprazole (PROTONIX) 40 mg tablet, Take 40 mg by mouth 2 (two) times a day, Disp: , Rfl:     olopatadine (PATANOL) 0 1 % ophthalmic solution, Apply 1 drop to eye 2 (two) times a day, Disp: , Rfl:     Sulfacetamide Sodium-Sulfur 9 8-4 8 % LIQD, Wash daily (Patient not taking: Reported on 3/23/2021), Disp: 1 Bottle, Rfl: 6    CONSTITUTIONAL:   Vitals:    03/23/21 1109   Temp: 98 3 °F (36 8 °C)   TempSrc: Tympanic   Weight: 59 9 kg (132 lb)   Height: 5' 2" (1 575 m)             Specific Alerts:    Have you been seen by a [facility]ologist in the last 3 years? YES    Are you pregnant or planning to become pregnant? No    Are you currently or planning to be nursing or breast feeding? No    No Known Allergies    May we call your Preferred Phone number to discuss your specific medical information? YES    May we leave a detailed message that includes your specific medical information? YES    Have you traveled outside of the [facility] in the past 3 months?  No    Do you currently have a pacemaker or defibrillator? No    Do you have any artificial heart valves, joints, plates, screws, rods, stents, pins, etc? No   - If Yes, were any placed within the last 2 years? Do you require any medications prior to a surgical procedure? No   - If Yes, for which procedure? - If Yes, what medications to you require? Are you taking any medications that cause you to bleed more easily ("blood thinners") No    Have you ever experienced a rapid heartbeat with epinephrine? No    Have you ever been treated with "gold" (gold sodium thiomalate) therapy? No    [facility] can help with wrinkles, "laugh lines," facial volume loss, "double chin," "love handles," age spots, and more  Are you interested in learning today about some of the skin enhancement procedures that we offer? (If Yes, please provide more detail) No    Review of Systems:  Have you recently had or currently have any of the following?     · Fever or chills: No  · Night Sweats: YES  · Headaches: YES  · Weight Gain: No  · Weight Loss: No  · Blurry Vision: No  · Nausea: No  · Vomiting: No  · Diarrhea: No  · Blood in Stool: No  · Abdominal Pain: No  · Itchy Skin: No  · Painful Joints: No  · Swollen Joints: No  · Muscle Pain: No  · Irregular Mole: No  · Sun Burn: No  · Dry Skin: YES  · Skin Color Changes: No  · Scar or Keloid: No  · Cold Sores/Fever Blisters: No  · Bacterial Infections/MRSA: No  · Anxiety: No  · Depression: No  · Suicidal or Homicidal Thoughts: No      PSYCH: Normal mood and affect  EYES: Normal conjunctiva  ENT: Normal lips and oral mucosa  CARDIOVASCULAR: No edema  RESPIRATORY: Normal respirations  HEME/LYMPH/IMMUNO:  No regional lymphadenopathy except as noted below in ASSESSMENT AND PLAN BY DIAGNOSIS    FULL ORGAN SYSTEM SKIN EXAM (SKIN)   Hair, Scalp, Ears, Face Normal except as noted below in Assessment   Neck, Cervical Chain Nodes Normal except as noted below in Assessment   Right Arm/Hand/Fingers Normal except as noted below in Assessment   Left Arm/Hand/Fingers Normal except as noted below in Assessment   Chest/Breasts/Axillae Viewed areas Normal except as noted below in Assessment   Abdomen, Umbilicus Normal except as noted below in Assessment   Back/Spine Normal except as noted below in Assessment   Right Leg, Foot, Toes Normal except as noted below in Assessment   Left Leg, Foot, Toes Normal except as noted below in Assessment         1  HISTORY OF BASAL CELL CARCINOMA    Physical Exam:   Anatomic Location Affected:  Multiple Sites    Morphological Description of scar:  Well Healed    Suspected Recurrence: No   Pertinent Positives:   Pertinent Negatives: Additional History of Present Condition:  History of basal cell carcinoma with no sign of recurrence    Assessment and Plan:  Based on a thorough discussion of this condition and the management approach to it (including a comprehensive discussion of the known risks, side effects and potential benefits of treatment), the patient (family) agrees to implement the following specific plan:   Recommend patient to follow up every 6 months to yearly for skin exam    Continue using sun protective clothing like long sleeves, long pants and a hat of 6 inches of diameter and an SPF 30+ daily when out in the sun  How can basal cell carcinoma be prevented? The most important way to prevent BCC is to avoid sunburn  This is especially important in childhood and early life  Fair skinned individuals and those with a personal or family history of BCC should protect their skin from sun exposure daily, year-round and lifelong     Stay indoors or under the shade in the middle of the day    Wear covering clothing    Apply high protection factor SPF50+ broad-spectrum sunscreens generously to exposed skin if outdoors    Avoid indoor tanning (sun beds, solaria)   Oral nicotinamide (vitamin B3) in a dose of 500 mg twice daily may reduce the number and severity of BCCs     What is the outlook for basal cell carcinoma? Most BCCs are cured by treatment  Cure is most likely if treatment is undertaken when the lesion is small  About 50% of people with BCC develop a second one within 3 years of the first  They are also at increased risk of other skin cancers, especially melanoma  Regular self-skin examinations and long-term annual skin checks by an experienced health professional are recommended  2  MELANOCYTIC NEVI ("Moles")    Physical Exam:   Anatomic Location Affected:   Mostly on sun-exposed areas of the Trunk and extremities    Morphological Description:  Scattered, 1-4mm round to ovoid, symmetrical-appearing, even bordered, skin colored to dark brown macules/papules, mostly in sun-exposed areas   Pertinent Positives:   Pertinent Negatives: Additional History of Present Condition:  Present for years  Assessment and Plan:  Based on a thorough discussion of this condition and the management approach to it (including a comprehensive discussion of the known risks, side effects and potential benefits of treatment), the patient (family) agrees to implement the following specific plan:   Reassure Benign   Monitor for any changes in size shape or color  Melanocytic Nevi  Melanocytic nevi ("moles") are tan or brown, raised or flat areas of the skin which have an increased number of melanocytes  Melanocytes are the cells in our body which make pigment and account for skin color  Some moles are present at birth (I e , "congenital nevi"), while others come up later in life (i e , "acquired nevi")  The sun can stimulate the body to make more moles  Sunburns are not the only thing that triggers more moles  Chronic sun exposure can do it too  Clinically distinguishing a healthy mole from melanoma may be difficult, even for experienced dermatologists   The "ABCDE's" of moles have been suggested as a means of helping to alert a person to a suspicious mole and the possible increased risk of melanoma  The suggestions for raising alert are as follows:    Asymmetry: Healthy moles tend to be symmetric, while melanomas are often asymmetric  Asymmetry means if you draw a line through the mole, the two halves do not match in color, size, shape, or surface texture  Asymmetry can be a result of rapid enlargement of a mole, the development of a raised area on a previously flat lesion, scaling, ulceration, bleeding or scabbing within the mole  Any mole that starts to demonstrate "asymmetry" should be examined promptly by a board certified dermatologist      Border: Healthy moles tend to have discrete, even borders  The border of a melanoma often blends into the normal skin and does not sharply delineate the mole from normal skin  Any mole that starts to demonstrate "uneven borders" should be examined promptly by a board certified dermatologist      Color: Healthy moles tend to be one color throughout  Melanomas tend to be made up of different colors ranging from dark black, blue, white, or red  Any mole that demonstrates a color change should be examined promptly by a board certified dermatologist      Diameter: Healthy moles tend to be smaller than 0 6 cm in size; an exception are "congenital nevi" that can be larger  Melanomas tend to grow and can often be greater than 0 6 cm (1/4 of an inch, or the size of a pencil eraser)  This is only a guideline, and many normal moles may be larger than 0 6 cm without being unhealthy  Any mole that starts to change in size (small to bigger or bigger to smaller) should be examined promptly by a board certified dermatologist      Evolving: Healthy moles tend to "stay the same "  Melanomas may often show signs of change or evolution such as a change in size, shape, color, or elevation    Any mole that starts to itch, bleed, crust, burn, hurt, or ulcerate or demonstrate a change or evolution should be examined promptly by a board certified dermatologist       Dysplastic Nevi  Dysplastic moles are moles that fit the ABCDE rules of melanoma but are not identified as melanomas when examined under the microscope  They may indicate an increased risk of melanoma in that person  If there is a family history of melanoma, most experts agree that the person may be at an increased risk for developing a melanoma  Experts still do not agree on what dysplastic moles mean in patients without a personal or family history of melanoma  Dysplastic moles are usually larger than common moles and have different colors within it with irregular borders  The appearance can be very similar to a melanoma  Biopsies of dysplastic moles may show abnormalities which are different from a regular mole  Melanoma  Malignant melanoma is a type of skin cancer that can be deadly if it spreads throughout the body  The incidence of melanoma in the [place] is growing faster than any other cancer  Melanoma usually grows near the surface of the skin for a period of time, and then begins to grow deeper into the skin  Once it grows deeper into the skin, the risk of spread to other organs greatly increases  Therefore, early detection and removal of a malignant melanoma may result in a better chance at a complete cure; removal after the tumor has spread may not be as effective, leading to worse clinical outcomes such as death  The true rate of nevus transformation into a melanoma is unknown  It has been estimated that the lifetime risk for any acquired melanocytic nevus on any 21year-old individual transforming into melanoma by age [de-identified] is 0 03% (1 in 3,164) for men and 0 009% (1 in 10,800) for women  The appearance of a "new mole" remains one of the most reliable methods for identifying a malignant melanoma  Occasionally, melanomas appear as rapidly growing, blue-black, dome-shaped bumps within a previous mole or previous area of normal skin    Other times, melanomas are suspected when a mole suddenly appears or changes  Itching, burning, or pain in a pigmented lesion should increase suspicion, but most patients with early melanoma have no skin discomfort whatsoever  Melanoma can occur anywhere on the skin, including areas that are difficult for self-examination  Many melanomas are first noticed by other family members  Suspicious-looking moles may be removed for microscopic examination  You may be able to prevent death from melanoma by doing two simple things:    1  Try to avoid unnecessary sun exposure and protect your skin when it is exposed to the sun  People who live near the equator, people who have intermittent exposures to large amounts of sun, and people who have had sunburns in childhood or adolescence have an increased risk for melanoma  Sun sense and vigilant sun protection may be keys to helping to prevent melanoma  We recommend wearing UPF-rated sun protective clothing and sunglasses whenever possible and applying a moisturizer-sunscreen combination product (SPF 50+) such as Neutrogena Daily Defense to sun exposed areas of skin at least three times a day  2  Have your moles regularly examined by a board certified dermatologist AND by yourself or a family member/friend at home  We recommend that you have your moles examined at least once a year by a board certified dermatologist   Use your birthday as an annual reminder to have your "Birthday Suit" (I e , your skin) examined; it is a nice birthday gift to yourself to know that your skin is healthy appearing! Additionally, at-home self examinations may be helpful for detecting a possible melanoma  Use the ABCDEs we discussed and check your moles once a month at home  3 PORTWINE STAIN   Physical Exam:   Anatomic Location Affected:  Left posterior leg    Morphological Description:  5 0 cm telangitic patch    Pertinent Positives:   Pertinent Negatives:     Additional History of Present Condition:  Present for years  Assessment and Plan:  Based on a thorough discussion of this condition and the management approach to it (including a comprehensive discussion of the known risks, side effects and potential benefits of treatment), the patient (family) agrees to implement the following specific plan:   Reassure Benign  4  LENTIGO    Physical Exam:   Anatomic Location Affected:  Trunk and extremities    Morphological Description:  tan - brown 1-2mm macules     Pertinent Positives:   Pertinent Negatives: Additional History of Present Condition:  Present for years  Assessment and Plan:  Based on a thorough discussion of this condition and the management approach to it (including a comprehensive discussion of the known risks, side effects and potential benefits of treatment), the patient (family) agrees to implement the following specific plan:   Reassure benign  Start using Neutrogena daily defense or Cerave AM 3 times daily for sun protection              What is a lentigo? A lentigo is a pigmented flat or slightly raised lesion with a clearly defined edge  Unlike an ephelis (freckle), it does not fade in the winter months  There are several kinds of lentigo  The name lentigo originally referred to its appearance resembling a small lentil  The plural of lentigo is lentigines, although lentigos is also in common use  Who gets lentigines? Lentigines can affect males and females of all ages and races  Solar lentigines are especially prevalent in fair skinned adults  Lentigines associated with syndromes are present at birth or arise during childhood  What causes lentigines? Common forms of lentigo are due to exposure to ultraviolet radiation:   Sun damage including sunburn    Indoor tanning    Phototherapy, especially photochemotherapy (PUVA)    Ionizing radiation, eg radiation therapy, can also cause lentigines    Several familial syndromes associated with widespread lentigines originate from mutations in Sukhi-MAP kinase, mTOR signaling and PTEN pathways  What are the clinical features of lentigines? Lentigines have been classified into several different types depending on what they look like, where they appear on the body, causative factors, and whether they are associated to other diseases or conditions  Lentigines may be solitary or more often, multiple  Most lentigines are smaller than 5 mm in diameter      Lentigo simplex   A precursor to junctional naevus    Arises during childhood and early adult life    Found on trunk and limbs    Small brown round or oval macule or thin plaque    Jagged or smooth edge    May have a dry surface    May disappear in time  Solar lentigo   A precursor to seborrhoeic keratosis    Found on chronically sun exposed sites such as hands, face, lower legs    May also follow sunburn to shoulders    Yellow, light or dark brown regular or irregular macule or thin plaque    May have a dry surface    Often has moth-eaten outline    Can slowly enlarge to several centimeters in diameter    May disappear, often through the process known as lichenoid keratosis    When atypical in appearance, may be difficult to distinguish from melanoma in situ  Ink spot lentigo   Also known as reticulated lentigo    Few in number compared to solar lentigines    Follows sunburn in very fair skinned individuals    Dark brown to black irregular ink spot-like macule  PUVA lentigo   Similar to ink spot lentigo but follows photochemotherapy (PUVA)    Location anywhere exposed to PUVA  Tanning bed lentigo   Similar to ink spot lentigo but follows indoor tanning    Location anywhere exposed to tanning bed  Radiation lentigo   Occurs in site of irradiation (accidental or therapeutic)    Associated with late-stage radiation dermatitis: epidermal atrophy, subcutaneous fibrosis, keratosis, telangiectasias  Melanotic macule   Mucosal surfaces or adjacent glabrous skin eg lip, vulva, penis, anus    Light to dark brown    Also called mucosal melanosis  Generalised lentigines   Found on any exposed or covered site from early childhood    Small macules may merge to form larger patches    Not associated with a syndrome    Also called lentigines profusa, multiple lentigines  Agminated lentigines   Naevoid eruption of lentigos confined to a single segmental area    Sharp demarcation in midline    May have associated neurological and developmental abnormalities  Patterned lentigines   Inherited tendency to lentigines on face, lips, buttocks, palms, soles    Recognised mainly in people of  ethnicity  Centrofacial neurodysraphic lentiginosis  Associated with mental retardation  Lentiginosis syndromes   Syndromes include LEOPARD/Maria Del Carmen, Peutz-Jeghers, Laugier-Hunziker, Moynahan, Xeroderma pigmentosum, myxoma syndromes (MONIQUE, NAME, [place]), Ruvalcaba-Myhre-Mednia, Bannayan-Zonnana syndrome, Cowden disease (multiple hamartoma syndrome )    Inheritance is autosomal dominant; sporadic cases common    Widespread lentigines present at birth or arise in early childhood    Associated with neural, endocrine, and mesenchymal tumors    How is the diagnosis made? Lentigines are usually diagnosed clinically by their typical appearance  Concern regarding possibility of melanoma may lead to:   Dermatoscopy    Diagnostic excision biopsy    Histopathology of a lentigo shows:   Thickened epidermis    An increased number of melanocytes along the basal layer of epidermis    Unlike junctional melanocytic naevus, there are no nests of melanocytes    Increased melanin pigment within the keratinocytes    Additional features depending on type of lentigo    In contrast, an ephelis (freckle) shows sun-induced increased melanin within the keratinocytes, without an increase in number of cells  What is the treatment for lentigines? Most lentigines are left alone   Attempts to lighten them may not be successful  The following approaches are used:   SPF 50+ broad-spectrum sunscreen    Hydroquinone bleaching cream    Alpha hydroxy acids    Vitamin C    Retinoids    Azelaic acid    Chemical peels  Individual lesions can be permanently removed using:   Cryotherapy    Intense pulsed light    Pigment lasers    How can lentigines be prevented? Lentigines associated with exposure ultraviolet radiation can be prevented by very careful sun protection  Clothing is more successful at preventing new lentigines than are sunscreens  What is the outlook for lentigines? Lentigines usually persist  They may increase in number with age and sun exposure  Some in sun-protected sites may fade and disappear      Scribe Attestation    I,:  Shelby Barber MA am acting as a scribe while in the presence of the attending physician :       I,:  Kathrin Cline MD personally performed the services described in this documentation    as scribed in my presence :

## 2022-01-27 ENCOUNTER — TELEPHONE (OUTPATIENT)
Dept: DERMATOLOGY | Facility: CLINIC | Age: 62
End: 2022-01-27

## 2022-01-27 NOTE — TELEPHONE ENCOUNTER
Pt calling to make appt with Dr Rafael Belcher  Appt made  Also sent file merge request since Pt is under another MRN #

## 2022-04-28 ENCOUNTER — OFFICE VISIT (OUTPATIENT)
Dept: DERMATOLOGY | Facility: CLINIC | Age: 62
End: 2022-04-28
Payer: COMMERCIAL

## 2022-04-28 VITALS — TEMPERATURE: 96.5 F | BODY MASS INDEX: 24.33 KG/M2 | WEIGHT: 133 LBS

## 2022-04-28 DIAGNOSIS — L57.0 KERATOSIS, ACTINIC: Primary | ICD-10-CM

## 2022-04-28 PROCEDURE — 99213 OFFICE O/P EST LOW 20 MIN: CPT | Performed by: DERMATOLOGY

## 2022-04-28 RX ORDER — FLUOROURACIL 50 MG/G
CREAM TOPICAL 2 TIMES DAILY
Qty: 40 G | Refills: 0 | Status: SHIPPED | OUTPATIENT
Start: 2022-04-28

## 2022-04-28 NOTE — PATIENT INSTRUCTIONS
HISTORY OF BASAL CELL CARCINOMA    Assessment and Plan:  Based on a thorough discussion of this condition and the management approach to it (including a comprehensive discussion of the known risks, side effects and potential benefits of treatment), the patient (family) agrees to implement the following specific plan:   Monitor for changes   Recommend yearly skin exams    How can basal cell carcinoma be prevented? The most important way to prevent BCC is to avoid sunburn  This is especially important in childhood and early life  Fair skinned individuals and those with a personal or family history of BCC should protect their skin from sun exposure daily, year-round and lifelong   Stay indoors or under the shade in the middle of the day    Wear covering clothing    Apply high protection factor SPF50+ broad-spectrum sunscreens generously to exposed skin if outdoors    Avoid indoor tanning (sun beds, solaria)   Oral nicotinamide (vitamin B3) in a dose of 500 mg twice daily may reduce the number and severity of BCCs  What is the outlook for basal cell carcinoma? Most BCCs are cured by treatment  Cure is most likely if treatment is undertaken when the lesion is small  About 50% of people with BCC develop a second one within 3 years of the first  They are also at increased risk of other skin cancers, especially melanoma  Regular self-skin examinations and long-term annual skin checks by an experienced health professional are recommended      ACTINIC KERATOSIS    Assessment and Plan:  Based on a thorough discussion of this condition and the management approach to it (including a comprehensive discussion of the known risks, side effects and potential benefits of treatment), the patient (family) agrees to implement the following specific plan:     Discussed using topical chemotherapy - Efudex 5% cream - apply topically 2x/day for 2 weeks from eyebrows to hairline, across the bridge of the nose and alar rim (avoid the groove of nose)    If face is not red and scaly by 2 weeks, then apply for an additional week   Follow up in a few months after using cream    Actinic keratoses are very common on sites repeatedly exposed to the sun, especially the backs of the hands and the face, most often affecting the ears, nose, cheeks, upper lip, vermilion of the lower lip, temples, forehead and balding scalp  In severely chronically sun-damaged individuals, they may also be found on the upper trunk, upper and lower limbs, and dorsum of feet  We discussed the theoretical premalignant (pre-cancerous) nature and etiology of these growths  We discussed the prevailing notion that actinic keratoses are a reflection of abnormal skin cell development due to DNA damage by short wavelength UVB  They are more likely to appear if the immune function is poor, due to aging, recent sun exposure, predisposing disease or certain drugs  We discussed that the main concern is that actinic keratoses may predispose to squamous cell carcinoma  It is rare for a solitary actinic keratosis to evolve to squamous cell carcinoma (SCC), but the risk of SCC occurring at some stage in a patient with more than 10 actinic keratoses is thought to be about 10 to 15%  A tender, thickened, ulcerated or enlarging actinic keratosis is suspicious of SCC  Actinic keratoses may be prevented by strict sun protection  If already present, keratoses may improve with a very high sun protection factor (50+) broad-spectrum sunscreen applied at least daily to affected areas, year-round  We recommend that UPF-rated clothing and hats and sunglasses be worn whenever possible and that a sunscreen-moisturizer combination product such as Neutrogena Daily Defense be applied at least three times a day      We performed a thorough discussion of treatment options and specific risk/benefits/alternatives including but not limited to medical field treatment with medications such as the following:     Topical field area medications such as 5-fluorouracil or Aldara (specifically, the trouble with long-term compliance, blistering and local skin reaction versus the convenience of at-home therapy and that field therapy gets what is not yet seen)   Cryotherapy (specifically, local pain, scarring, dyspigmentation, blistering, possible superinfection, and treats only what we see versus directed treatment today)   Photodynamic therapy (specifically, local pain, scarring, dyspigmentation, blistering, possible superinfection, need to schedule for a later date, and time spent in the office versus field therapy that gets what is not yet seen)  EFUDEX (5-Fluorouracil, 5-FU)      Efudex is a chemotherapy medication when taken by mouth; however, in dermatology we use it to treat skin conditions such as warts, some skin cancers and most commonly actinic (solar) keratoses  When applied topically the medication is not absorbed into the bloodstream and does not cause typical chemotherapy side effects  It is safe to your body   Efudex works by destroying the damaged cells on your skin  As a result it causes redness, irritation, and scabbing  This is a normal part of therapeutic skin response to Efudex   The treated areas need to be completely protected from the sun during the treatment and the recovery process  If you need to go out into the sun, cover the area with a hat, long sleeve shirts, gloves, etc    Treatment approaches very depending on the condition that is being treated  Your doctor will specify which approach is right for you  INSTRUCTIONS FOR USE    Treating an Area of Skin   This approach is used when you have widespread involvement over an area such as the scalp, face, or forearms   Usually, in this case, Efudex is applied twice a day   The duration of treatment depends on the skin condition and the specific anatomic area being treated   Typically treatment extends for 2-3 weeks for the face versus 4-6 weeks for the scalp, arms and legs  What to Expect During Therapy   Redness  o This caused by inflammation and destruction of the abnormal cells and indicates your lesions are responding to the treatment    o You may use an over-the-counter hydrocortisone 1% ointment to decrease redness, although the treatment may be somewhat more effective if you are able to avoid hydrocortisone use    o It usually takes 2-4 weeks for the redness to fade after stopping the Efudex; most of the redness resolves over the first 1-2 weeks   Crusting and peeling   o This occurs as the sun-damaged skin is removed to allow for replacement by normal skin  o Cool washcloth soaks (washcloth soaked with cool water over face for 15-20 minutes 3-4 times per day) can help as well as a moisturizing ointment, such as Vaseline or Aquaphor ointment    o It is preferable to be moisturized rather than have the skin be dry    o It is fine to wash your face with plain water or a mild cleanser such as Dove bar or Cetaphil Gentle Facial Cleanser during your treatment course   Itching and pain   o This can be controlled with acetaminophen (Tylenol) or non-steroidal anti- inflammatory medication (ibuprofen, naproxen)     Some PRO" Tips    You can apply the Efudex with a Q-tip (if spot treating), gloves, or fingers  If fingers are used, however, remember to wash your hands thoroughly to avoid irritation on normal skin   Care must be taken with Efudex during monique months because the medicine is reactive with the sun  DO NOT USE EFUDEX IN SUMMER MONTHS unless specifically directed to by your doctor   Do NOT use Efudex on eyelids or lips unless specifically directed to do so   Care must be taken with Efudex in skin fold areas like the fold from the nose to the corner of the mouth  Try to avoid having any Efudex accumulate in those areas   Not all "bumps" will respond to the Efudex  There are non-cancerous ("benign") types of other keratoses that will not react to the medication (such as seborrheic keratoses)  If the area treated is not getting red it does not mean the medicine is not working   Consider the Efudex as an investment in the health of your skin:  The benefits of sticking it out are worth it! Efudex is an excellent way to reverse many years of sun damage  When to Contact Your Dermatologist    Once you have completed your prescribed course of therapy, wait for a period of 4 weeks to see if treated areas resolve  If you have lesions that have not responded, make a follow-up appointment with your doctor in about 8 weeks   In evaluating unresponsive areas your doctor will need the skin completely settled down, and this usually takes a period of at least two months   If you are having extensive burning, itching, swelling or tenderness call [facility] at [phone] (SKIN) to arrange an Efudex check with our clinical nurse, who has extensive experience with Efudex treatment  Although the Efudex treatment sometimes produces dramatic redness, crusting and peeling, problems such as allergic reactions and infection are rare and should be evaluated promptly

## 2022-04-28 NOTE — PROGRESS NOTES
[facility] Follow Up Note    Patient Name: Jolly Murphy  Encounter Date: 04/28/22    Today's Chief Concerns:  Valaria Reil Concern #1:  Full skin exam   Concern #2:  Hx BCC    Current Medications:    Current Outpatient Medications:     Cyanocobalamin (VITAMIN B 12 PO), Take 500 mg by mouth daily, Disp: , Rfl:     fexofenadine (ALLEGRA) 180 MG tablet, Take 180 mg by mouth daily as needed, Disp: , Rfl:     fluticasone (FLONASE) 50 mcg/act nasal spray, 1 spray into each nostril daily, Disp: , Rfl:     levothyroxine 88 mcg tablet, Take 88 mcg by mouth daily, Disp: , Rfl:     Magnesium Gluconate 550 MG TABS, Take 1,000 mg by mouth daily, Disp: , Rfl:     estradiol-norethindrone (ACTIVELLA) 1-0 5 MG per tablet, Take 1 tablet by mouth daily, Disp: , Rfl:     meloxicam (MOBIC) 7 5 mg tablet, Take 7 5 mg by mouth daily, Disp: , Rfl:     metroNIDAZOLE (METROCREAM) 0 75 % cream, Apply topically twice daily (Patient not taking: Reported on 4/28/2022 ), Disp: 45 g, Rfl: 6    olopatadine (PATANOL) 0 1 % ophthalmic solution, Apply 1 drop to eye 2 (two) times a day, Disp: , Rfl:     pantoprazole (PROTONIX) 40 mg tablet, Take 40 mg by mouth 2 (two) times a day, Disp: , Rfl:     Sulfacetamide Sodium-Sulfur 9 8-4 8 % LIQD, Wash daily (Patient not taking: Reported on 3/23/2021), Disp: 1 Bottle, Rfl: 6    CONSTITUTIONAL:   Vitals:    04/28/22 1354   Temp: (!) 96 5 °F (35 8 °C)   TempSrc: Temporal   Weight: 60 3 kg (133 lb)       Specific Alerts:    Have you been seen by a [facility] in the last 3 years? YES    Are you pregnant or planning to become pregnant? No    Are you currently or planning to be nursing or breast feeding? No    No Known Allergies    May we call your Preferred Phone number to discuss your specific medical information? YES    May we leave a detailed message that includes your specific medical information?  YES    Have you traveled outside of the [address] [place] [address] in the past 3 months? No    Do you currently have a pacemaker or defibrillator? No    Do you have any artificial heart valves, joints, plates, screws, rods, stents, pins, etc? No   - If Yes, were any placed within the last 2 years? Do you require any medications prior to a surgical procedure? No   - If Yes, for which procedure? - If Yes, what medications to you require? Are you taking any medications that cause you to bleed more easily ("blood thinners") No    Have you ever experienced a rapid heartbeat with epinephrine? No    Review of Systems:  Have you recently had or currently have any of the following?     · Fever or chills: No  · Night Sweats: No  · Headaches: No  · Weight Gain: No  · Weight Loss: No  · Blurry Vision: No  · Nausea: No  · Vomiting: No  · Diarrhea: No  · Blood in Stool: No  · Abdominal Pain: No  · Itchy Skin: No  · Painful Joints: No  · Swollen Joints: No  · Muscle Pain: No  · Irregular Mole: No  · Sun Burn: No  · Dry Skin: No  · Skin Color Changes: No  · Scar or Keloid: No  · Cold Sores/Fever Blisters: No  · Bacterial Infections/MRSA: No  · Anxiety: No  · Depression: No  · Suicidal or Homicidal Thoughts: No      PSYCH: Normal mood and affect  EYES: Normal conjunctiva  ENT: Normal lips and oral mucosa  CARDIOVASCULAR: No edema  RESPIRATORY: Normal respirations  HEME/LYMPH/IMMUNO:  No regional lymphadenopathy except as noted below in ASSESSMENT AND PLAN BY DIAGNOSIS    FULL ORGAN SYSTEM SKIN EXAM (SKIN)   Hair, Scalp, Ears, Face Normal except as noted below in Assessment   Neck, Cervical Chain Nodes Normal except as noted below in Assessment   Right Arm/Hand/Fingers Normal except as noted below in Assessment   Left Arm/Hand/Fingers Normal except as noted below in Assessment   Chest/Breasts/Axillae Viewed areas Normal except as noted below in Assessment   Abdomen, Umbilicus Normal except as noted below in Assessment   Back/Spine Normal except as noted below in Assessment Groin/Genitalia/Buttocks Viewed areas Normal except as noted below in Assessment   Right Leg, Foot, Toes Normal except as noted below in Assessment   Left Leg, Foot, Toes Normal except as noted below in Assessment       HISTORY OF BASAL CELL CARCINOMA    Physical Exam:   Anatomic Location Affected:  Mid upper back, multiple other sites over the years   Morphological Description of scar:  Well healed scars   Suspected Recurrence: No   Pertinent Positives:   Pertinent Negatives: Additional History of Present Condition:  History of basal cell carcinoma with no sign of recurrence    Assessment and Plan:  Based on a thorough discussion of this condition and the management approach to it (including a comprehensive discussion of the known risks, side effects and potential benefits of treatment), the patient (family) agrees to implement the following specific plan:   Monitor for changes   Recommend yearly skin exams    How can basal cell carcinoma be prevented? The most important way to prevent BCC is to avoid sunburn  This is especially important in childhood and early life  Fair skinned individuals and those with a personal or family history of BCC should protect their skin from sun exposure daily, year-round and lifelong   Stay indoors or under the shade in the middle of the day    Wear covering clothing    Apply high protection factor SPF50+ broad-spectrum sunscreens generously to exposed skin if outdoors    Avoid indoor tanning (sun beds, solaria)   Oral nicotinamide (vitamin B3) in a dose of 500 mg twice daily may reduce the number and severity of BCCs  What is the outlook for basal cell carcinoma? Most BCCs are cured by treatment  Cure is most likely if treatment is undertaken when the lesion is small  About 50% of people with BCC develop a second one within 3 years of the first  They are also at increased risk of other skin cancers, especially melanoma   Regular self-skin examinations and long-term annual skin checks by an experienced health professional are recommended  ACTINIC KERATOSIS    Physical Exam:   Anatomic Location Affected:  Nose, forehead   Morphological Description:  Scaly pink papules    Additional History of Present Condition:  Present on examination    Assessment and Plan:  Based on a thorough discussion of this condition and the management approach to it (including a comprehensive discussion of the known risks, side effects and potential benefits of treatment), the patient (family) agrees to implement the following specific plan:     Discussed using topical chemotherapy - Efudex 5% cream - apply topically 2x/day for 2 weeks from eyebrows to hairline, across the bridge of the nose and alar rim (avoid the groove of nose)    If face is not red and scaly by 2 weeks, then apply for an additional week   Follow up in a few months after using cream    Actinic keratoses are very common on sites repeatedly exposed to the sun, especially the backs of the hands and the face, most often affecting the ears, nose, cheeks, upper lip, vermilion of the lower lip, temples, forehead and balding scalp  In severely chronically sun-damaged individuals, they may also be found on the upper trunk, upper and lower limbs, and dorsum of feet  We discussed the theoretical premalignant (pre-cancerous) nature and etiology of these growths  We discussed the prevailing notion that actinic keratoses are a reflection of abnormal skin cell development due to DNA damage by short wavelength UVB  They are more likely to appear if the immune function is poor, due to aging, recent sun exposure, predisposing disease or certain drugs  We discussed that the main concern is that actinic keratoses may predispose to squamous cell carcinoma   It is rare for a solitary actinic keratosis to evolve to squamous cell carcinoma (SCC), but the risk of SCC occurring at some stage in a patient with more than 10 actinic keratoses is thought to be about 10 to 15%  A tender, thickened, ulcerated or enlarging actinic keratosis is suspicious of SCC  Actinic keratoses may be prevented by strict sun protection  If already present, keratoses may improve with a very high sun protection factor (50+) broad-spectrum sunscreen applied at least daily to affected areas, year-round  We recommend that UPF-rated clothing and hats and sunglasses be worn whenever possible and that a sunscreen-moisturizer combination product such as Neutrogena Daily Defense be applied at least three times a day  We performed a thorough discussion of treatment options and specific risk/benefits/alternatives including but not limited to medical field treatment with medications such as the following:     Topical field area medications such as 5-fluorouracil or Aldara (specifically, the trouble with long-term compliance, blistering and local skin reaction versus the convenience of at-home therapy and that field therapy gets what is not yet seen)   Cryotherapy (specifically, local pain, scarring, dyspigmentation, blistering, possible superinfection, and treats only what we see versus directed treatment today)   Photodynamic therapy (specifically, local pain, scarring, dyspigmentation, blistering, possible superinfection, need to schedule for a later date, and time spent in the office versus field therapy that gets what is not yet seen)  EFUDEX (5-Fluorouracil, 5-FU)      Efudex is a chemotherapy medication when taken by mouth; however, in dermatology we use it to treat skin conditions such as warts, some skin cancers and most commonly actinic (solar) keratoses  When applied topically the medication is not absorbed into the bloodstream and does not cause typical chemotherapy side effects  It is safe to your body   Efudex works by destroying the damaged cells on your skin  As a result it causes redness, irritation, and scabbing   This is a normal part of therapeutic skin response to Efudex   The treated areas need to be completely protected from the sun during the treatment and the recovery process  If you need to go out into the sun, cover the area with a hat, long sleeve shirts, gloves, etc    Treatment approaches very depending on the condition that is being treated  Your doctor will specify which approach is right for you  INSTRUCTIONS FOR USE    Treating an Area of Skin   This approach is used when you have widespread involvement over an area such as the scalp, face, or forearms   Usually, in this case, Efudex is applied twice a day   The duration of treatment depends on the skin condition and the specific anatomic area being treated  Typically treatment extends for 2-3 weeks for the face versus 4-6 weeks for the scalp, arms and legs  What to Expect During Therapy   Redness  o This caused by inflammation and destruction of the abnormal cells and indicates your lesions are responding to the treatment    o You may use an over-the-counter hydrocortisone 1% ointment to decrease redness, although the treatment may be somewhat more effective if you are able to avoid hydrocortisone use    o It usually takes 2-4 weeks for the redness to fade after stopping the Efudex; most of the redness resolves over the first 1-2 weeks   Crusting and peeling   o This occurs as the sun-damaged skin is removed to allow for replacement by normal skin  o Cool washcloth soaks (washcloth soaked with cool water over face for 15-20 minutes 3-4 times per day) can help as well as a moisturizing ointment, such as Vaseline or Aquaphor ointment    o It is preferable to be moisturized rather than have the skin be dry    o It is fine to wash your face with plain water or a mild cleanser such as Dove bar or Cetaphil Gentle Facial Cleanser during your treatment course        Itching and pain   o This can be controlled with acetaminophen (Tylenol) or non-steroidal anti- inflammatory medication (ibuprofen, naproxen)     Some PRO" Tips    You can apply the Efudex with a Q-tip (if spot treating), gloves, or fingers  If fingers are used, however, remember to wash your hands thoroughly to avoid irritation on normal skin   Care must be taken with Efudex during monique months because the medicine is reactive with the sun  DO NOT USE EFUDEX IN SUMMER MONTHS unless specifically directed to by your doctor   Do NOT use Efudex on eyelids or lips unless specifically directed to do so   Care must be taken with Efudex in skin fold areas like the fold from the nose to the corner of the mouth  Try to avoid having any Efudex accumulate in those areas   Not all "bumps" will respond to the Efudex  There are non-cancerous ("benign") types of other keratoses that will not react to the medication (such as seborrheic keratoses)  If the area treated is not getting red it does not mean the medicine is not working   Consider the Efudex as an investment in the health of your skin:  The benefits of sticking it out are worth it! Efudex is an excellent way to reverse many years of sun damage  When to Contact Your Dermatologist    Once you have completed your prescribed course of therapy, wait for a period of 4 weeks to see if treated areas resolve  If you have lesions that have not responded, make a follow-up appointment with your doctor in about 8 weeks   In evaluating unresponsive areas your doctor will need the skin completely settled down, and this usually takes a period of at least two months   If you are having extensive burning, itching, swelling or tenderness call [facility] at [phone] (SKIN) to arrange an Efudex check with our clinical nurse, who has extensive experience with Efudex treatment   Although the Efudex treatment sometimes produces dramatic redness, crusting and peeling, problems such as allergic reactions and infection are rare and should be evaluated promptly       Scribe Attestation    I,:  Toya Gama am acting as a scribe while in the presence of the attending physician :       I,:  Ajit Tinoco MD personally performed the services described in this documentation    as scribed in my presence :

## 2022-09-27 ENCOUNTER — OFFICE VISIT (OUTPATIENT)
Dept: DERMATOLOGY | Facility: CLINIC | Age: 62
End: 2022-09-27
Payer: COMMERCIAL

## 2022-09-27 VITALS — WEIGHT: 131 LBS | HEIGHT: 62 IN | TEMPERATURE: 99 F | BODY MASS INDEX: 24.11 KG/M2

## 2022-09-27 DIAGNOSIS — D48.5 NEOPLASM OF UNCERTAIN BEHAVIOR OF SKIN: Primary | ICD-10-CM

## 2022-09-27 PROCEDURE — 11103 TANGNTL BX SKIN EA SEP/ADDL: CPT | Performed by: DERMATOLOGY

## 2022-09-27 PROCEDURE — 88305 TISSUE EXAM BY PATHOLOGIST: CPT | Performed by: STUDENT IN AN ORGANIZED HEALTH CARE EDUCATION/TRAINING PROGRAM

## 2022-09-27 PROCEDURE — 11104 PUNCH BX SKIN SINGLE LESION: CPT | Performed by: DERMATOLOGY

## 2022-09-27 PROCEDURE — 99214 OFFICE O/P EST MOD 30 MIN: CPT | Performed by: DERMATOLOGY

## 2022-09-27 NOTE — PATIENT INSTRUCTIONS
Plan:  1  Instructed to keep the wound dry and covered for 24-48h and clean thereafter  2  Warning signs of infection were reviewed  3  Recommended that the patient use acetaminophen as needed for pain  4  Sutures if any should be removed in 10-14 days, daughter will remove sutures      III  POST-PROCEDURAL CARE (WHAT YOU WILL NEED TO DO "AFTER THE BIOPSY" TO OPTIMIZE HEALING)    Keep the area clean and dry  Try NOT to remove the bandage or get it wet for the first 24 hours  Gently clean the area and apply surgical ointment (such as Vaseline petrolatum ointment, which is available "over the counter" and not a prescription) to the biopsy site for up to 2 weeks straight  This acts to protect the wound from the outside world  Sutures are not usually placed in this procedure  If any sutures were placed, return for suture removal as instructed (generally 1 week for the face, 2 weeks for the body)  Take Acetaminophen (Tylenol) for discomfort, if no contraindications  Ibuprofen or aspirin could make bleeding worse  Call our office immediately for signs of infection: fever, chills, increased redness, warmth, tenderness, discomfort/pain, or pus or foul smell coming from the wound  WHAT TO DO IF THERE IS ANY BLEEDING? If a small amount of bleeding is noticed, place a clean cloth over the area and apply firm pressure for ten minutes  Check the wound after 10 minutes of direct pressure  If bleeding persists, try one more time for an additional 10 minutes of direct pressure on the area  If the bleeding becomes heavier or does not stop after the second attempt, or if you have any other questions about this procedure, then please call your [facility] Dermatologist by calling [phone] (SKIN)

## 2022-09-27 NOTE — PROGRESS NOTES
[facility] Follow Up Note    Patient Name: Aida Arreaga  Encounter Date: 9/27/2022    Today's Chief Concerns:  Concern #1:  Lesions on right bicep and left shin      Current Medications:    Current Outpatient Medications:     fexofenadine (ALLEGRA) 180 MG tablet, Take 180 mg by mouth daily as needed, Disp: , Rfl:     fluorouracil (EFUDEX) 5 % cream, Apply topically 2 (two) times a day For 2-3 weeks, Disp: 40 g, Rfl: 0    fluticasone (FLONASE) 50 mcg/act nasal spray, 1 spray into each nostril daily, Disp: , Rfl:     levothyroxine 88 mcg tablet, Take 88 mcg by mouth daily, Disp: , Rfl:     meloxicam (MOBIC) 7 5 mg tablet, Take 7 5 mg by mouth daily, Disp: , Rfl:     Multiple Vitamins-Minerals (CENTRUM SILVER 50+WOMEN PO), Take by mouth in the morning, Disp: , Rfl:     olopatadine (PATANOL) 0 1 % ophthalmic solution, Apply 1 drop to eye if needed, Disp: , Rfl:     Cyanocobalamin (VITAMIN B 12 PO), Take 500 mg by mouth daily, Disp: , Rfl:     estradiol-norethindrone (ACTIVELLA) 1-0 5 MG per tablet, Take 1 tablet by mouth daily, Disp: , Rfl:     Magnesium Gluconate 550 MG TABS, Take 1,000 mg by mouth daily, Disp: , Rfl:     metroNIDAZOLE (METROCREAM) 0 75 % cream, Apply topically twice daily (Patient not taking: Reported on 4/28/2022 ), Disp: 45 g, Rfl: 6    pantoprazole (PROTONIX) 40 mg tablet, Take 40 mg by mouth 2 (two) times a day, Disp: , Rfl:     Sulfacetamide Sodium-Sulfur 9 8-4 8 % LIQD, Wash daily (Patient not taking: Reported on 3/23/2021), Disp: 1 Bottle, Rfl: 6    CONSTITUTIONAL:   Vitals:    09/27/22 1427   Temp: 99 °F (37 2 °C)   TempSrc: Temporal   Weight: 59 4 kg (131 lb)   Height: 5' 2" (1 575 m)         Specific Alerts:    Have you been seen by a [facility] Dermatologist in the last 3 years? YES    Are you pregnant or planning to become pregnant? No    Are you currently or planning to be nursing or breast feeding?  No    No Known Allergies    May we call your Preferred Phone number to discuss your specific medical information? YES    May we leave a detailed message that includes your specific medical information? YES    Have you traveled outside of the [facility] in the past 3 months? No    Do you currently have a pacemaker or defibrillator? No    Do you have any artificial heart valves, joints, plates, screws, rods, stents, pins, etc? No   - If Yes, were any placed within the last 2 years? Do you require any medications prior to a surgical procedure? No   - If Yes, for which procedure? - If Yes, what medications to you require? Are you taking any medications that cause you to bleed more easily ("blood thinners") No    Have you ever experienced a rapid heartbeat with epinephrine? No    Have you ever been treated with "gold" (gold sodium thiomalate) therapy? No    [facility] can help with wrinkles, "laugh lines," facial volume loss, "double chin," "love handles," age spots, and more  Are you interested in learning today about some of the skin enhancement procedures that we offer? (If Yes, please provide more detail) No    Review of Systems:  Have you recently had or currently have any of the following?     Fever or chills: No  Night Sweats: No  Headaches: No  Weight Gain: No  Weight Loss: No  Blurry Vision: No  Nausea: No  Vomiting: No  Diarrhea: No  Blood in Stool: No  Abdominal Pain: No  Itchy Skin: No  Painful Joints: No  Swollen Joints: No  Muscle Pain: No  Irregular Mole: No  Sun Burn: No  Dry Skin: No  Skin Color Changes: No  Scar or Keloid: No  Cold Sores/Fever Blisters: No  Bacterial Infections/MRSA: No  Anxiety: No  Depression: No  Suicidal or Homicidal Thoughts: No      PSYCH: Normal mood and affect  EYES: Normal conjunctiva  ENT: Normal lips and oral mucosa  CARDIOVASCULAR: No edema  RESPIRATORY: Normal respirations  HEME/LYMPH/IMMUNO:  No regional lymphadenopathy except as noted below in ASSESSMENT AND PLAN BY DIAGNOSIS    FULL ORGAN SYSTEM SKIN EXAM (SKIN)   Hair, Scalp, Ears, Face Normal except as noted below in Assessment   Neck, Normal except as noted below in Assessment   Right Arm/Hand/Fingers Normal except as noted below in Assessment   Left Arm/Hand/Fingers Normal except as noted below in Assessment                       Left Leg, Foot, Toes Normal except as noted below in Assessment       1  NEOPLASM OF UNCERTAIN BEHAVIOR OF SKIN    Physical Exam:  (Anatomic Location); (Size and Morphological Description); (Differential Diagnosis):  Specimen A; Right bicep; 0 5 cm crusted pearly papule; Differential diagnosis; Rule out Basal cell carcinoma  Specimen B; Left upper shin; 0 5 cm friable red papule; Differential diagnosis; Pyogenic granuloma vs  Basal cell carcinoma vs  Amelanotic melanoma  Pertinent Positives:  Pertinent Negatives: Additional History of Present Condition:  Both lesions present for 4 months  Assessment and Plan:  I have discussed with the patient that a sample of skin via a "skin biopsy would be potentially helpful to further make a specific diagnosis under the microscope  Based on a thorough discussion of this condition and the management approach to it (including a comprehensive discussion of the known risks, side effects and potential benefits of treatment), the patient (family) agrees to implement the following specific plan:    Procedure:  Skin Biopsy  After a thorough discussion of treatment options and risk/benefits/alternatives (including but not limited to local pain, scarring, dyspigmentation, blistering, possible superinfection, and inability to confirm a diagnosis via histopathology), verbal and written consent were obtained and portion of the rash was biopsied for tissue sample  See below for consent that was obtained from patient and subsequent Procedure Note        PROCEDURE TANGENTIAL (SHAVE) BIOPSY NOTE:    Performing Physician: Dr Gregory  Anatomic Location; Clinical Description with size (cm); Pre-Op Diagnosis:   Specimen A; Right bicep; 0 5 cm crusted pearly papule; Differential diagnosis; Rule out Basal cell carcinoma  Post-op diagnosis: Same     Local anesthesia: 1% lidocaine HCL    Topical anesthesia: None    Hemostasis: Aluminum chloride       After obtaining informed consent  at which time there was a discussion about the purpose of biopsy  and low risks of infection and bleeding  The area was prepped and draped in the usual fashion  Anesthesia was obtained with 1% lidocaine with epinephrine  A shave biopsy to an appropriate sampling depth was obtained by Shave (Dermablade or 15 blade) The resulting wound was covered with surgical ointment and bandaged appropriately  The patient tolerated the procedure well without complications and was without signs of functional compromise  Specimen has been sent for review by Dermatopathology  Standard post-procedure care has been explained and has been included in written form within the patient's copy of Informed Consent      After obtaining informed consent  at which time there was a discussion about the purpose of biopsy  and low risks of infection and bleeding  The area was prepped and draped in the usual fashion  Anesthesia was obtained with 1% lidocaine with epinephrine  A shave biopsy to an appropriate sampling depth was obtained by Shave (Dermablade or 15 blade) The resulting wound was covered with surgical ointment and bandaged appropriately  The patient tolerated the procedure well without complications and was without signs of functional compromise  Specimen has been sent for review by Dermatopathology  Standard post-procedure care has been explained and has been included in written form within the patient's copy of Informed Consent      INFORMED CONSENT DISCUSSION AND POST-OPERATIVE INSTRUCTIONS FOR PATIENT    I   RATIONALE FOR PROCEDURE  I understand that a skin biopsy allows the Dermatologist to test a lesion or rash under the microscope to obtain a diagnosis  It usually involves numbing the area with numbing medication and removing a small piece of skin; sometimes the area will be closed with sutures  In this specific procedure, sutures are not usually needed  If any sutures are placed, then they are usually need to be removed in 2 weeks or less  I understand that my Dermatologist recommends that a skin "shave" biopsy be performed today  A local anesthetic, similar to the kind that a dentist uses when filling a cavity, will be injected with a very small needle into the skin area to be sampled  The injected skin and tissue underneath "will go to sleep and become numb so no pain should be felt afterwards  An instrument shaped like a tiny "razor blade" (shave biopsy instrument) will be used to cut a small piece of tissue and skin from the area so that a sample of tissue can be taken and examined more closely under the microscope  A slight amount of bleeding will occur, but it will be stopped with direct pressure and a pressure bandage and any other appropriate methods  I understands that a scar will form where the wound was created  Surgical ointment will be applied to help protect the wound  Sutures are not usually needed  II   RISKS AND POTENTIAL COMPLICATIONS   I understand the risks and potential complications of a skin biopsy include but are not limited to the following:  Bleeding  Infection  Pain  Scar/keloid  Skin discoloration  Incomplete Removal  Recurrence  Nerve Damage/Numbness/Loss of Function  Allergic Reaction to Anesthesia  Biopsies are diagnostic procedures and based on findings additional treatment or evaluation may be required  Loss or destruction of specimen resulting in no additional findings    My Dermatologist has explained to me the nature of the condition, the nature of the procedure, and the benefits to be reasonably expected compared with alternative approaches    My Dermatologist has discussed the likelihood of major risks or complications of this procedure including the specific risks listed above, such as bleeding, infection, and scarring/keloid  I understand that a scar is expected after this procedure  I understand that my physician cannot predict if the scar will form a "keloid," which extends beyond the borders of the wound that is created  A keloid is a thick, painful, and bumpy scar  A keloid can be difficult to treat, as it does not always respond well to therapy, which includes injecting cortisone directly into the keloid every few weeks  While this usually reduces the pain and size of the scar, it does not eliminate it  I understand that photographs may be taken before and after the procedure  These will be maintained as part of the medical providers confidential records and may not be made available to me  I further authorize the medical provider to use the photographs for teaching purposes or to illustrate scientific papers, books, or lectures if in his/her judgment, medical research, education, or science may benefit from its use  I have had an opportunity to fully inquire about the risks and benefits of this procedure and its alternatives  I have been given ample time and opportunity to ask questions and to seek a second opinion if I wished to do so  I acknowledge that there have specifically been no guarantees as to the cosmetic results from the procedure  I am aware that with any procedure there is always the possibility of an unexpected complication  III  POST-PROCEDURAL CARE (WHAT YOU WILL NEED TO DO "AFTER THE BIOPSY" TO OPTIMIZE HEALING)    Keep the area clean and dry  Try NOT to remove the bandage or get it wet for the first 24 hours  Gently clean the area and apply surgical ointment (such as Vaseline petrolatum ointment, which is available "over the counter" and not a prescription) to the biopsy site for up to 2 weeks straight  This acts to protect the wound from the outside world  Sutures are not usually placed in this procedure  If any sutures were placed, return for suture removal as instructed (generally 1 week for the face, 2 weeks for the body)  Take Acetaminophen (Tylenol) for discomfort, if no contraindications  Ibuprofen or aspirin could make bleeding worse  Call our office immediately for signs of infection: fever, chills, increased redness, warmth, tenderness, discomfort/pain, or pus or foul smell coming from the wound  WHAT TO DO IF THERE IS ANY BLEEDING? If a small amount of bleeding is noticed, place a clean cloth over the area and apply firm pressure for ten minutes  Check the wound after 10 minutes of direct pressure  If bleeding persists, try one more time for an additional 10 minutes of direct pressure on the area  If the bleeding becomes heavier or does not stop after the second attempt, or if you have any other questions about this procedure, then please call your [facility] Dermatologist by calling [phone] (SKIN)  I hereby acknowledge that I have reviewed and verified the site with my Dermatologist and have requested and authorized my Dermatologist to proceed with the procedure  PROCEDURE NOTE:  PUNCH BIOPSY      Performing Physician: Dr Gregory    Anatomic Location; Clinical Description with size (cm); Pre-Op Diagnosis:    Specimen B; Left upper shin; 0 5 cm friable red papule; Differential diagnosis; Pyogenic granuloma vs  Basal cell carcinoma vs  Amelanotic melanoma       Anesthesia: 1% Lidocaine HCL      Topical anesthesia: None       Indications: To indicate diagnosis and management plan  Procedure Details     Patient informed of the risks (including bleeding,scaring and infection) and benefits of the procedure explained  Verbal and written informed consent obtained  The area was prepped and draped in the usual fashion   Anesthesia was obtained with 1% lidocaine with epinephrine  The skin was then stretched perpendicular to the skin tension lines and a punch biopsy to an appropriate sampling depth was obtained with a 4 mm punch with a forceps and iris scissors  Hemostasis was obtained with 4-0 Ethilon x 2 sutures  Complications:  None      Specimen has been sent for review by Dermatopathology  Plan:  1  Instructed to keep the wound dry and covered for 24-48h and clean thereafter  2  Warning signs of infection were reviewed  3  Recommended that the patient use acetaminophen as needed for pain  4  Sutures if any should be removed in 10-14 days, daughter will remove sutures    Standard post-procedure care has been explained and has been included in written form within the patient's copy of Informed Consent        Scribe Attestation    I,:  Matthew Doherty MA am acting as a scribe while in the presence of the attending physician :       I,:  Warren Mercer MD personally performed the services described in this documentation    as scribed in my presence :

## 2022-10-06 NOTE — RESULT ENCOUNTER NOTE
DERMATOPATHOLOGY RESULT NOTE    Results reviewed by ordering physician  Called patient to personally discuss results  Discussed results with patient  Instructions for Clinical Derm Team:   (remember to route Result Note to appropriate staff):    Call patient and schedule for excsions 2 lesions  Can do same day but need more than 30 min  Result & Plan by Specimen:    Specimen A: malignant  Plan: excision      Specimen B: malignant  Plan: excision    Final Diagnosis  A  Skin, right bicep, shave biopsy:     BASAL CELL CARCINOMA (SUPERFICIAL TYPE)           B  Skin, left upper shin, shave biopsy:     SQUAMOUS CELL CARCINOMA IN SITU

## 2022-10-07 ENCOUNTER — TELEPHONE (OUTPATIENT)
Dept: DERMATOLOGY | Facility: CLINIC | Age: 62
End: 2022-10-07

## 2022-10-07 NOTE — TELEPHONE ENCOUNTER
Called and left message for patient to call the office to schedule surgical appointment  Will need more then 30 minutes to do both areas as requested by Dr Oleg White

## 2022-11-01 ENCOUNTER — PROCEDURE VISIT (OUTPATIENT)
Dept: DERMATOLOGY | Facility: CLINIC | Age: 62
End: 2022-11-01

## 2022-11-01 VITALS — HEIGHT: 62 IN | TEMPERATURE: 98.7 F | BODY MASS INDEX: 24.29 KG/M2 | WEIGHT: 132 LBS

## 2022-11-01 DIAGNOSIS — D04.72 SQUAMOUS CELL CARCINOMA IN SITU (SCCIS) OF SKIN OF LEFT LOWER LEG: ICD-10-CM

## 2022-11-01 DIAGNOSIS — C44.612 BASAL CELL CARCINOMA (BCC) OF RIGHT UPPER ARM: Primary | ICD-10-CM

## 2022-11-01 RX ORDER — MELOXICAM 15 MG/1
15 TABLET ORAL DAILY
COMMUNITY

## 2022-11-01 NOTE — PROGRESS NOTES
PROCEDURE:  EXCISION WITH INTERMEDIATE LAYERED CLOSURE     Attending: Dr Gregory  Assistant: Rhoda White    Pre-Op Diagnosis: Basal cell carcinoma  Post-Op Diagnosis: Same   Benign versus Malignant Malignant      Lesion Anatomic Location: Right bicep (Previous Accession Number: [id number])  Pre-op size: 0 7 cm  Size of defect:  1 1 cm (with 0 2 centimeter margins)  Final repaired wound length:  2 6 cm    Written and verbal, witnessed informed consent was obtained  I discussed that excision is a method of removing lesions both benign and malignant lesions  A portion of normal skin is often taken to ensure completeness of removal   I reviewed that procedure will include numbing the area,  cutting around and under defect, undermining tissue, and closing the wound with sutures both inside and out  These sutures are usually removed in 7 to 14 days  Risks (bleeding, pain, infection, scarring, recurrence) and benefits discussed  It was discussed with patient that every effort is made to minimize scar, but scarring is influenced also by extrinsic factor such as location, age and genetics  Time Out: performed:  yes  Correct patient: yes  Correct site per Clinic Report: yes  Correct site per Patient Report: yes    LOCAL ANESTHESIA: 1% xylocaine with epi     DESCRIPTION OF PROCEDURE: The patient was brought back into the procedure room, anesthetized locally, prepped and draped in the usual fashion  Using a #15 blade with a scalpel, the lesion was excised in elliptical fashion  The wound was  undermined in the  fascial plane  Hemostasis was achieved with light electrocoagulation  Purpose of undermining was to decrease wound tension and facilitate closure  The wound was closed with subcutaneous sutures as follows:    Deep suture:4-0 Vicryl      Epidermal edge closure was accomplished with superficial sutures as follows:    Superficial suture: 4-0 Ethilon  Superficial suture type:  Interrupted    Estimated blood loss less than 3ml  The patient tolerated the procedure well without any complications  The wound was cleaned with sterile saline, dried off, surgical vaseline ointment was applied, and the wound was covered  A pressure dressing was applied for stabilization and light pressure  The patient was given detailed oral and written instructions on postoperative care as detailed in consent  The patient left in good medical condition  PROCEDURE:  EXCISION WITH INTERMEDIATE LAYERED CLOSURE     Attending: Dr Gregory  Assistant: Rhoda White    Pre-Op Diagnosis: Squamous cell carcinoma in situ  Post-Op Diagnosis: Same   Benign versus Malignant Malignant      Lesion Anatomic Location: Left upper shin (Previous Accession Number: [id number])  Pre-op size: 0 7 cm  Size of defect:  0 9 cm (with 0 1 centimeter margins)  Final repaired wound length:  2 2 cm    Written and verbal, witnessed informed consent was obtained  I discussed that excision is a method of removing lesions both benign and malignant lesions  A portion of normal skin is often taken to ensure completeness of removal   I reviewed that procedure will include numbing the area,  cutting around and under defect, undermining tissue, and closing the wound with sutures both inside and out  These sutures are usually removed in 7 to 14 days  Risks (bleeding, pain, infection, scarring, recurrence) and benefits discussed  It was discussed with patient that every effort is made to minimize scar, but scarring is influenced also by extrinsic factor such as location, age and genetics  Time Out: performed:  Yes  Correct patient: yes  Correct site per Clinic Report: yes  Correct site per Patient Report: yes    LOCAL ANESTHESIA: 1% xylocaine with epi     DESCRIPTION OF PROCEDURE: The patient was brought back into the procedure room, anesthetized locally, prepped and draped in the usual fashion  Using a #15 blade with a scalpel, the lesion was excised in elliptical fashion   The wound was  undermined in the  fascial plane  Hemostasis was achieved with light electrocoagulation  Purpose of undermining was to decrease wound tension and facilitate closure  The wound was closed with subcutaneous sutures as follows:    Deep suture:None      Epidermal edge closure was accomplished with superficial sutures as follows:    Superficial suture: 4-0 Ethilon  Superficial suture type: Interrupted    Estimated blood loss less than 3ml  The patient tolerated the procedure well without any complications  The wound was cleaned with sterile saline, dried off, surgical vaseline ointment was applied, and the wound was covered  A pressure dressing was applied for stabilization and light pressure  The patient was given detailed oral and written instructions on postoperative care as detailed in consent  The patient left in good medical condition  POSTOP DISCUSSION DISCUSSION AND INSTRUCTIONS FOR PATIENT      Rationale for Procedure  A skin excision allows the dermatologist to remove a lesion  The procedure involves a local numbing medication and removing the entire lesion  Typically, the lesion is being removed because it is atypical, traumatized, or for significant appearance reasons  The area will be open like a brush burn and allowed to heal    There will be no sutures  Tissue is sent to pathologist who will reconfirm diagnosis and verify completeness of lesion removal     Description of Procedure  We would like to perform a skin excision today  A local anesthetic, similar to the kind that a dentist uses when filling a cavity, will be injected with a very small needle into the skin area to be sampled  The injected skin and tissue underneath should “go to sleep” and become numbed so that no further pain should be felt  A scalpel will be used to cut around the lesion and tissue will be submitted to pathology for examination    Depending on the diagnosis the lesion will be excised with a certain amount of normal skin to help assure completeness of lesion removal   The physician will discuss in advance the anticipated size and extent of removal    Bleeding will occur, but it will stopped with direct pressure, sutures, and electrocautery  Surgical “Vaseline-type” ointment will also applied after the procedure to help create a barrier between the wound and the outside world  Risks and Potential Complications  The advantage of a skin excision is that it allows us to remove a problem lesion quickly  Although this usually permits the lesion to heal as soon as possible with the least scarring, there are some risks and potential complications that include but are not limited to the following:  - Some bleeding is normal at time of procedure and some bleeding on gauze is normal  the first few days after surgery  Profuse bleeding and bleeding with swelling and pain should be reported as detailed  below  - Infection is uncommon in skin surgery  Infection should be reported and is indicated by pain, redness, and discharge of purulent material   - Some dull to at time sharp pain could occur initially the day after surgery  Persistent pain or increasing pain days after surgery is not expected and should be reported  - Every effort is made to minimize scar, but location, size, and genetics do play a role in scar appearance  A surgical wound does not achieve its optimal appearance until 6 months  There are several treatments available if scarring would be problematic including scar creams, silicone pad, laser and scar revision   - Skin discoloration can occur especially in people of color  Its important to avoid sun on wound in first 6 months after surgery    Treatment is available if pigment is problematic   - Incomplete removal of the lesion or recurrence of lesion can occur and this would then require further treatment and more surgery   - Nerve Damage/Numbness/Loss of Function is very rare, but is most likely to occur if lesion is large or if it is in a high risk location  - Allergic Reaction to lidocaine is rare  More commonly,  epinephrine is used  with the lidocaine  Occasionally, epinephrine (aka adrenalin) may cause a brief  feeling of anxiety or jitteriness  - The person at the microscope  (pathologist) may provide additional information that was unexpected  This unexpected finding could provoke the need for additional treatment or evaluation  What You Will Need to Do After the Procedure  1  Keep the area clean and dry the first day  Try NOT to remove the bandage for the first day  2  Gently clean the area with soap and water and apply Vaseline ointment (this is over the counter and not a prescription) to the excision  site for up to 2 weeks  3  Apply a clean appropriately sized bandage to area  Gauze and paper tape are recommended for sensitive skin  4  Return for suture removal as instructed (generally 1 week for the face, 2 weeks for the body)  5  Take Acetaminophen (Tylenol) for discomfort, if no contraindications  Do NOT take Ibuprofen or aspirin unless specifically told to do so by your Dermatologist because these medications can make bleeding worse  6  Call our office immediately for signs of infection: fever, chills, increased redness, warmth, tenderness, discomfort/pain, or pus or foul smell coming from the wound  If bleeding is noticed, place a clean cloth over the area and apply firm pressure for thirty minutes  Check the wound ONLY after 30 minutes of direct pressure; do not cheat and sneak a peak, as that does not count  If bleeding persists after 30 minutes of legitimate direct pressure, then try one more round of direct pressure for an additional 10 minutes to the area    Should the bleeding become heavier or not stop after the second attempt, call [facility] directly at [phone] (SKIN) or, if after hours, go to your local Emergency Room/Emergency Department        Scribe Attestation    I,:  Estelle Sheikh MA am acting as a scribe while in the presence of the attending physician :       I,:  Jackie Curtis MD personally performed the services described in this documentation    as scribed in my presence :

## 2022-11-15 ENCOUNTER — OFFICE VISIT (OUTPATIENT)
Dept: DERMATOLOGY | Facility: CLINIC | Age: 62
End: 2022-11-15

## 2022-11-15 DIAGNOSIS — Z48.02 VISIT FOR SUTURE REMOVAL: Primary | ICD-10-CM

## 2022-11-15 NOTE — PROGRESS NOTES
Suture removal    Date/Time: 11/15/2022 8:20 AM  Performed by: Donald Parmar  Authorized by: Saritha Espinosa MD   Universal Protocol:  Consent: Verbal consent obtained  Consent given by: patient  Patient understanding: patient states understanding of the procedure being performed  Patient consent: the patient's understanding of the procedure matches consent given  Procedure consent: procedure consent matches procedure scheduled  Relevant documents: relevant documents present and verified  Test results: test results not available  Site marked: the operative site was marked  Patient identity confirmed: verbally with patient        Patient location:  Clinic  Location:     Anesthesia laterality: Right Upper Arm and Left Upper Shin  Procedure details: Tools used:  Suture removal kit    Wound appearance:  No sign(s) of infection, good wound healing and clean    Number of sutures removed:  8  Post-procedure details:     Post-removal:  Band-Aid applied (and Vaseline)    Patient tolerance of procedure:   Tolerated well, no immediate complications

## 2023-04-04 ENCOUNTER — OFFICE VISIT (OUTPATIENT)
Dept: DERMATOLOGY | Facility: CLINIC | Age: 63
End: 2023-04-04

## 2023-04-04 VITALS — WEIGHT: 133 LBS | BODY MASS INDEX: 24.48 KG/M2 | HEIGHT: 62 IN

## 2023-04-04 DIAGNOSIS — Z85.89 HISTORY OF SQUAMOUS CELL CARCINOMA: ICD-10-CM

## 2023-04-04 DIAGNOSIS — L57.0 KERATOSIS, ACTINIC: ICD-10-CM

## 2023-04-04 DIAGNOSIS — Z85.828 HISTORY OF BASAL CELL CARCINOMA: Primary | ICD-10-CM

## 2023-04-04 RX ORDER — FLUOROURACIL 50 MG/G
CREAM TOPICAL 2 TIMES DAILY
Qty: 40 G | Refills: 0 | Status: SHIPPED | OUTPATIENT
Start: 2023-04-04

## 2023-04-04 NOTE — PROGRESS NOTES
"[facility] Note     Patient Name: Mia Jenkins  Encounter Date: 4/4/2023     Have you been cared for by a [place] [id number] Dermatologist in the last 3 years and, if so, which description applies to you? Yes  I have been here within the last 3 years, and my medical history has NOT changed since that time  I am FEMALE/of child-bearing potential     REVIEW OF SYSTEMS:  Have you recently had or currently have any of the following? · No changes in my recent health  PAST MEDICAL HISTORY:  Have you personally ever had or currently have any of the following? If \"YES,\" then please provide more detail  · No changes in my medical history  FAMILY HISTORY:  Any \"first degree relatives\" (parent, brother, sister, or child) with the following? • No changes in my family's known health  PATIENT EXPERIENCE:    • Do you want the Dermatologist to perform a COMPLETE skin exam today including a clinical examination under the \"bra and underwear\" areas? Yes  • If necessary, do we have your permission to call and leave a detailed message on your Preferred Phone number that includes your specific medical information?   Yes      No Known Allergies   Current Outpatient Medications:   •  fexofenadine (ALLEGRA) 180 MG tablet, Take 180 mg by mouth daily as needed, Disp: , Rfl:   •  fluticasone (FLONASE) 50 mcg/act nasal spray, 1 spray into each nostril daily, Disp: , Rfl:   •  levothyroxine 88 mcg tablet, Take 88 mcg by mouth daily, Disp: , Rfl:   •  meloxicam (MOBIC) 7 5 mg tablet, Take 7 5 mg by mouth daily, Disp: , Rfl:   •  Multiple Vitamins-Minerals (CENTRUM SILVER 50+WOMEN PO), Take by mouth in the morning, Disp: , Rfl:   •  olopatadine (PATANOL) 0 1 % ophthalmic solution, Apply 1 drop to eye if needed, Disp: , Rfl:   •  Cyanocobalamin (VITAMIN B 12 PO), Take 500 mg by mouth daily, Disp: , Rfl:   •  estradiol-norethindrone (ACTIVELLA) 1-0 5 MG per tablet, Take 1 tablet by mouth daily, Disp: , Rfl:   •  " fluorouracil (EFUDEX) 5 % cream, Apply topically 2 (two) times a day For 2-3 weeks, Disp: 40 g, Rfl: 0  •  Magnesium Gluconate 550 MG TABS, Take 1,000 mg by mouth daily, Disp: , Rfl:   •  meloxicam (MOBIC) 15 mg tablet, Take 15 mg by mouth daily, Disp: , Rfl:   •  metroNIDAZOLE (METROCREAM) 0 75 % cream, Apply topically twice daily, Disp: 45 g, Rfl: 6  •  pantoprazole (PROTONIX) 40 mg tablet, Take 40 mg by mouth 2 (two) times a day, Disp: , Rfl:   •  Sulfacetamide Sodium-Sulfur 9 8-4 8 % LIQD, Wash daily, Disp: 1 Bottle, Rfl: 6          • Whom besides the patient is providing clinical information about today's encounter?   o NO ADDITIONAL HISTORIAN (patient alone provided history)    Physical Exam and Assessment/Plan by Diagnosis:    HISTORY OF BASAL CELL CARCINOMA    Physical Exam:  • Anatomic Location Affected:  Right bicep and mid upper back   • Morphological Description of scar:  Well healed scar   • Suspected Recurrence: No  • Pertinent Positives:  • Pertinent Negatives: Additional History of Present Condition:  History of basal cell carcinoma with no sign of recurrence    Assessment and Plan:  Based on a thorough discussion of this condition and the management approach to it (including a comprehensive discussion of the known risks, side effects and potential benefits of treatment), the patient (family) agrees to implement the following specific plan:  • When outside we recommend using a wide brim hat, sunglasses, long sleeve and pants, sunscreen with SPF 27+ with reapplication every 2 hours, or SPF specific clothing   • Monitor for any reoccurrence     How can basal cell carcinoma be prevented? The most important way to prevent BCC is to avoid sunburn  This is especially important in childhood and early life  Fair skinned individuals and those with a personal or family history of BCC should protect their skin from sun exposure daily, year-round and lifelong    • Stay indoors or under the shade in the middle of the day   • Wear covering clothing   • Apply high protection factor SPF50+ broad-spectrum sunscreens generously to exposed skin if outdoors   • Avoid indoor tanning (sun beds, solaria)  • Oral nicotinamide (vitamin B3) in a dose of 500 mg twice daily may reduce the number and severity of BCCs  What is the outlook for basal cell carcinoma? Most BCCs are cured by treatment  Cure is most likely if treatment is undertaken when the lesion is small  About 50% of people with BCC develop a second one within 3 years of the first  They are also at increased risk of other skin cancers, especially melanoma  Regular self-skin examinations and long-term annual skin checks by an experienced health professional are recommended  HISTORY OF SQUAMOUS CELL CARCINOMA     Physical Exam:  • Anatomic Location Affected:  Left upper shin  • Morphological Description of Scar:  Well healed scar   • Suspected Recurrence: no  • Regional adenopathy: no    Additional History of Present Condition:  Pt had the SCC removed on 11/01/2022 by Dr Gregory  Assessment and Plan:  Based on a thorough discussion of this condition and the management approach to it (including a comprehensive discussion of the known risks, side effects and potential benefits of treatment), the patient (family) agrees to implement the following specific plan:  • Monitor for any reoccurrence     How can SCC be prevented? The most important way to prevent SCC is to avoid sunburn  This is especially important in childhood and early life  Fair skinned individuals and those with a personal or family history of BCC should protect their skin from sun exposure daily, year-round and lifelong  • Stay indoors or under the shade in the middle of the day   • Wear covering clothing   • Apply high protection factor SPF50+ broad-spectrum sunscreens generously to exposed skin if outdoors   • Avoid indoor tanning (sun beds, solaria)      What is the outlook for SCC?   Most SCC are cured by treatment  Cure is most likely if treatment is undertaken when the lesion is small  A small percent of SCC's can spread to lymph  nodes and long term monitoring is indicated  They are also at increased risk of other skin cancers, especially melanoma  Regular self-skin examinations and long-term annual skin checks by an experienced health professional are recommended  ACTINIC KERATOSIS    Physical Exam:  • Anatomic Location Affected:  Upper lip  • Morphological Description:  Scaly pink patch     Additional History of Present Condition:  Pt presents with new areas of scaling  Asymptomatic     Assessment and Plan:  Based on a thorough discussion of this condition and the management approach to it (including a comprehensive discussion of the known risks, side effects and potential benefits of treatment), the patient (family) agrees to implement the following specific plan:    • Can apply Efudex cream 2 times daily for 2 weeks  • Monitor for any changes    Actinic keratoses are very common on sites repeatedly exposed to the sun, especially the backs of the hands and the face  They are considered precancers and have a low risk of turning into squamous cell carcinoma  It is rare for a solitary actinic keratosis to evolve into a squamous cell carcinoma (SCC), but the risk is 10-15% when more than 10 actinic keratoses are present  A tender, thickened, ulcerated or enlarging actinic keratosis is suspicious of SCC  Actinic keratoses may be prevented by strict sun protection  If already present, keratoses may improve with a very high sun protection factor (50+) broad-spectrum sunscreen applied at least daily to affected areas, year-round  We recommend that sun protective clothing and hats and sunglasses be worn whenever possible    Note that you can make you own UPF 30 rate clothing using just your own washing machine with a product called sun guard    There are several different options for treating actinic keratoses    • Topical “medications such as 5-fluorouracil or Aldara  - good for field treatment ie treats what's seen and not seen    • Cryotherapy - good for single spots but treats Frederic what we see” versus a field treatment    • Photodynamic therapy - involves application of a light sensitizing medicine and then exposure to a special light, also a good field treatment        Today we opted to treat your actinic keratoses with Efudex     Scribe Attestation    I,:  Lulu Clarke MD am acting as a scribe while in the presence of the attending physician :       I,:  Ha Park personally performed the services described in this documentation    as scribed in my presence :

## 2023-04-04 NOTE — PATIENT INSTRUCTIONS
Assessment and Plan:  Based on a thorough discussion of this condition and the management approach to it (including a comprehensive discussion of the known risks, side effects and potential benefits of treatment), the patient (family) agrees to implement the following specific plan:    Can apply Efudex cream 2 times daily for 2 weeks  Monitor for any changes    Actinic keratoses are very common on sites repeatedly exposed to the sun, especially the backs of the hands and the face  They are considered precancers and have a low risk of turning into squamous cell carcinoma  It is rare for a solitary actinic keratosis to evolve into a squamous cell carcinoma (SCC), but the risk is 10-15% when more than 10 actinic keratoses are present  A tender, thickened, ulcerated or enlarging actinic keratosis is suspicious of SCC  Actinic keratoses may be prevented by strict sun protection  If already present, keratoses may improve with a very high sun protection factor (50+) broad-spectrum sunscreen applied at least daily to affected areas, year-round  We recommend that sun protective clothing and hats and sunglasses be worn whenever possible    Note that you can make you own UPF 30 rate clothing using just your own washing machine with a product called sun guard    There are several different options for treating actinic keratoses    Topical “medications such as 5-fluorouracil or Aldara  - good for field treatment ie treats what's seen and not seen    Cryotherapy - good for single spots but treats Frederic what we see” versus a field treatment    Photodynamic therapy - involves application of a light sensitizing medicine and then exposure to a special light, also a good field treatment        Today we opted to treat your actinic keratoses with Efudex

## 2023-12-12 ENCOUNTER — OFFICE VISIT (OUTPATIENT)
Dept: DERMATOLOGY | Facility: CLINIC | Age: 63
End: 2023-12-12
Payer: COMMERCIAL

## 2023-12-12 VITALS — WEIGHT: 134.2 LBS | BODY MASS INDEX: 24.69 KG/M2 | HEIGHT: 62 IN | TEMPERATURE: 97.2 F

## 2023-12-12 DIAGNOSIS — Z85.89 HISTORY OF SQUAMOUS CELL CARCINOMA: ICD-10-CM

## 2023-12-12 DIAGNOSIS — Z85.828 HISTORY OF BASAL CELL CARCINOMA: Primary | ICD-10-CM

## 2023-12-12 DIAGNOSIS — D22.5 MULTIPLE BENIGN MELANOCYTIC NEVI OF BOTH UPPER EXTREMITIES, BOTH LOWER EXTREMITIES, AND TRUNK: ICD-10-CM

## 2023-12-12 DIAGNOSIS — D22.71 MULTIPLE BENIGN MELANOCYTIC NEVI OF BOTH UPPER EXTREMITIES, BOTH LOWER EXTREMITIES, AND TRUNK: ICD-10-CM

## 2023-12-12 DIAGNOSIS — D22.72 MULTIPLE BENIGN MELANOCYTIC NEVI OF BOTH UPPER EXTREMITIES, BOTH LOWER EXTREMITIES, AND TRUNK: ICD-10-CM

## 2023-12-12 DIAGNOSIS — L82.1 SEBORRHEIC KERATOSIS: ICD-10-CM

## 2023-12-12 DIAGNOSIS — L81.4 SOLAR LENTIGO: ICD-10-CM

## 2023-12-12 DIAGNOSIS — D18.01 CHERRY ANGIOMA: ICD-10-CM

## 2023-12-12 DIAGNOSIS — D22.62 MULTIPLE BENIGN MELANOCYTIC NEVI OF BOTH UPPER EXTREMITIES, BOTH LOWER EXTREMITIES, AND TRUNK: ICD-10-CM

## 2023-12-12 DIAGNOSIS — D22.61 MULTIPLE BENIGN MELANOCYTIC NEVI OF BOTH UPPER EXTREMITIES, BOTH LOWER EXTREMITIES, AND TRUNK: ICD-10-CM

## 2023-12-12 PROCEDURE — 17003 DESTRUCT PREMALG LES 2-14: CPT | Performed by: DERMATOLOGY

## 2023-12-12 PROCEDURE — 17000 DESTRUCT PREMALG LESION: CPT | Performed by: DERMATOLOGY

## 2023-12-12 PROCEDURE — 99214 OFFICE O/P EST MOD 30 MIN: CPT | Performed by: DERMATOLOGY

## 2023-12-12 NOTE — PROGRESS NOTES
[facility] Note     Patient Name: Ines Noe  Encounter Date: 12/12/23     Have you been cared for by a [facility] Dermatologist in the last 3 years and, if so, which description applies to you? Yes. I have been here within the last 3 years, and my medical history has NOT changed since that time. I am FEMALE/of child-bearing potential.    REVIEW OF SYSTEMS:  Have you recently had or currently have any of the following? No changes in my recent health. PAST MEDICAL HISTORY:  Have you personally ever had or currently have any of the following? If "YES," then please provide more detail. No changes in my medical history. HISTORY OF IMMUNOSUPPRESSION: Do you have a history of any of the following:  Systemic Immunosuppression such as Diabetes, Biologic or Immunotherapy, Chemotherapy, Organ Transplantation, Bone Marrow Transplantation? No     Answering "YES" requires the addition of the dotphrase "IMMUNOSUPPRESSED" as the first diagnosis of the patient's visit. FAMILY HISTORY:  Any "first degree relatives" (parent, brother, sister, or child) with the following? No changes in my family's known health. PATIENT EXPERIENCE:    Do you want the Dermatologist to perform a COMPLETE skin exam today including a clinical examination under the "bra and underwear" areas? Yes  If necessary, do we have your permission to call and leave a detailed message on your Preferred Phone number that includes your specific medical information?   Yes      No Known Allergies   Current Outpatient Medications:   •  fexofenadine (ALLEGRA) 180 MG tablet, Take 180 mg by mouth daily as needed, Disp: , Rfl:   •  fluorouracil (EFUDEX) 5 % cream, Apply topically 2 (two) times a day, Disp: 40 g, Rfl: 0  •  fluticasone (FLONASE) 50 mcg/act nasal spray, 1 spray into each nostril daily, Disp: , Rfl:   •  levothyroxine 88 mcg tablet, Take 88 mcg by mouth daily, Disp: , Rfl:   •  Multiple Vitamins-Minerals (CENTRUM SILVER 50+WOMEN PO), Take by mouth in the morning, Disp: , Rfl:   •  Cyanocobalamin (VITAMIN B 12 PO), Take 500 mg by mouth daily, Disp: , Rfl:   •  estradiol-norethindrone (ACTIVELLA) 1-0.5 MG per tablet, Take 1 tablet by mouth daily, Disp: , Rfl:   •  Magnesium Gluconate 550 MG TABS, Take 1,000 mg by mouth daily, Disp: , Rfl:   •  meloxicam (MOBIC) 15 mg tablet, Take 15 mg by mouth daily, Disp: , Rfl:   •  meloxicam (MOBIC) 7.5 mg tablet, Take 7.5 mg by mouth daily, Disp: , Rfl:   •  metroNIDAZOLE (METROCREAM) 0.75 % cream, Apply topically twice daily, Disp: 45 g, Rfl: 6  •  olopatadine (PATANOL) 0.1 % ophthalmic solution, Apply 1 drop to eye if needed, Disp: , Rfl:   •  pantoprazole (PROTONIX) 40 mg tablet, Take 40 mg by mouth 2 (two) times a day, Disp: , Rfl:   •  Sulfacetamide Sodium-Sulfur 9.8-4.8 % LIQD, Wash daily, Disp: 1 Bottle, Rfl: 6          Whom besides the patient is providing clinical information about today's encounter? NO ADDITIONAL HISTORIAN (patient alone provided history)    Physical Exam and Assessment/Plan by Diagnosis:    Follow up on Efudex - upper lip - 2 weeks, left leg birthmark, shins - spots turned red, spot on left forearm for three weeks    -Do not itch, bleed, or burn     HISTORY OF SQUAMOUS CELL CARCINOMA     Physical Exam:  Anatomic Location Affected:  Left upper shin  Morphological Description of Scar:  Well healed scar   Suspected Recurrence: no  Regional adenopathy: no    Additional History of Present Condition:  Excision done on 11/01/22    Assessment and Plan:  Based on a thorough discussion of this condition and the management approach to it (including a comprehensive discussion of the known risks, side effects and potential benefits of treatment), the patient (family) agrees to implement the following specific plan:  Monitor for reoccurrence     How can SCC be prevented? The most important way to prevent SCC is to avoid sunburn.  This is especially important in childhood and early life. Fair skinned individuals and those with a personal or family history of BCC should protect their skin from sun exposure daily, year-round and lifelong. Stay indoors or under the shade in the middle of the day   Wear covering clothing   Apply high protection factor SPF50+ broad-spectrum sunscreens generously to exposed skin if outdoors   Avoid indoor tanning (sun beds, solaria)      What is the outlook for SCC? Most SCC are cured by treatment. Cure is most likely if treatment is undertaken when the lesion is small. A small percent of SCC's can spread to lymph  nodes and long term monitoring is indicated. They are also at increased risk of other skin cancers, especially melanoma. Regular self-skin examinations and long-term annual skin checks by an experienced health professional are recommended. HISTORY OF BASAL CELL CARCINOMA    Physical Exam:  Anatomic Location Affected:  Right bicep and mid upper back  Morphological Description of scar:  Well healed scar  Suspected Recurrence: No  Pertinent Positives:  Pertinent Negatives: Additional History of Present Condition:  History of basal cell carcinoma with no sign of recurrence    Assessment and Plan:  Based on a thorough discussion of this condition and the management approach to it (including a comprehensive discussion of the known risks, side effects and potential benefits of treatment), the patient (family) agrees to implement the following specific plan:  Use a moisturizer + sunscreen "combo" product such as Neutrogena Daily Defense SPF 50+ or CeraVe AM at least three times a day. Monitor for changes or reoccurrence     How can basal cell carcinoma be prevented? The most important way to prevent BCC is to avoid sunburn. This is especially important in childhood and early life. Fair skinned individuals and those with a personal or family history of BCC should protect their skin from sun exposure daily, year-round and lifelong.   Stay indoors or under the shade in the middle of the day   Wear covering clothing   Apply high protection factor SPF50+ broad-spectrum sunscreens generously to exposed skin if outdoors   Avoid indoor tanning (sun beds, solaria)  Oral nicotinamide (vitamin B3) in a dose of 500 mg twice daily may reduce the number and severity of BCCs. What is the outlook for basal cell carcinoma? Most BCCs are cured by treatment. Cure is most likely if treatment is undertaken when the lesion is small. About 50% of people with BCC develop a second one within 3 years of the first. They are also at increased risk of other skin cancers, especially melanoma. Regular self-skin examinations and long-term annual skin checks by an experienced health professional are recommended. CHERRY ANGIOMAS     Physical Exam:  Anatomic Location Affected:  Trunk and extremites  Morphological Description:  Scattered cherry red papules  Denies pain, itch, bleeding. No treatments tried. Present for years. Present constantly; no modifying factors which make it worse or better. Assessment and Plan:  Based on a thorough discussion of this condition and the management approach to it (including a comprehensive discussion of the known risks, side effects and potential benefits of treatment), the patient (family) agrees to implement the following specific plan:  Reassure benign        SEBORRHEIC KERATOSIS; NON-INFLAMED     Physical Exam:  Anatomic Location Affected:  Trunk and extremities  Morphological Description:  Waxy, smooth to warty textured, yellow to brownish-grey to dark brown to blackish, discrete, "stuck-on" appearing papules. Present for years. Denies pain, itch, bleeding. Additional History of Present Condition:  Present constantly; no modifying factors which make it worse or better. No prior treatment.        Assessment and Plan:  Based on a thorough discussion of this condition and the management approach to it (including a comprehensive discussion of the known risks, side effects and potential benefits of treatment), the patient (family) agrees to implement the following specific plan:  Reassure benign  Use sun protection. Apply SPF 30 or higher at least three times a day. Wear sun protecting clothing and hats. SOLAR LENTIGINES   OTHER SKIN CHANGES DUE TO CHRONIC EXPOSURE TO NONIONIZING RADIATION     Physical Exam:  Anatomic Location Affected:  Sun exposed areas of back, chest, arms, legs  Morphological Description:  Multiple scattered brown to tan evenly pigmented macules   Denies pain, itch, bleeding. No treatments tried. Present for months - years. Reports getting newer lesions with sun exposure. Assessment and Plan:  Based on a thorough discussion of this condition and the management approach to it (including a comprehensive discussion of the known risks, side effects and potential benefits of treatment), the patient (family) agrees to implement the following specific plan:  Reassure benign  Use sun protection. Apply SPF 30 or higher at least three times a day. Wear sun protecting clothing and hats. MULTIPLE MELANOCYTIC NEVI ("Moles")     Physical Exam:  Anatomic Location Affected: Trunk and extremities  Morphological Description:  Scattered, round to ovoid, symmetrical-appearing, even bordered, skin colored to dark brown macules/papules  Denies pain, itch, bleeding. No treatments tried. Present for years. Present constantly; no modifying factors which make it worse or better. Denies actively changing or growing moles. Assessment and Plan:  Based on a thorough discussion of this condition and the management approach to it (including a comprehensive discussion of the known risks, side effects and potential benefits of treatment), the patient (family) agrees to implement the following specific plan:  Reassure benign  Monitor for changes  Use sun protection. Apply SPF 30 or higher at least three times a day.   Wear sun protecting clothing and hats. Worrisome signs of skin malignancy discussed, questions answered. Regular self-skin check discussed. Advised to call or return to office if patient notices any spots of concern, rapidly growing/changing lesions, bleeding lesions, non-healing lesions. Advised regular SPF use. VASCULAR BLUSH   Physical Exam:  Anatomic Location Affected:  blanchable 0.5cm macule  Morphological Description:  vascular telangiectasia  Pertinent Positives:  Pertinent Negatives: Additional History of Present Condition:  Treated upper lip with Efudex for two weeks. Does not itch, bleed, burn, or hurt. Assessment and Plan:  Based on a thorough discussion of this condition and the management approach to it (including a comprehensive discussion of the known risks, side effects and potential benefits of treatment), the patient (family) agrees to implement the following specific plan:  Reassured benign, continue to monitor for changes. Follow up in 6 months     ACTINIC KERATOSES  Physical Exam:  Anatomic Location Affected:  Bilateral shins  Morphological Description: Thin pink papule(s) with gritty scale       Assessment and Plan:  Based on a thorough discussion of this condition and the management approach to it (including a comprehensive discussion of the known risks, side effects and potential benefits of treatment), the patient (family) agrees to implement the following specific plan:  Treated with cryotherapy today; written and verbal consent obtained     PROCEDURE:  DESTRUCTION OF PRE-MALIGNANT LESIONS  After a thorough discussion of treatment options and risk/benefits/alternatives (including but not limited to local pain, scarring, dyspigmentation, blistering, and possible superinfection), verbal and written consent were obtained and the aforementioned lesions were treated on with cryotherapy using liquid nitrogen x 2 cycles for 5-10 seconds.  The patient tolerated the procedure well, and after-care instructions were provided. TOTAL NUMBER of 3 pre-malignant lesions were treated today on the ANATOMIC LOCATION: bilateral shins. Patient instructions: Your pre-cancerous lesions (called actinic keratosis) were treated with liquid nitrogen today. The treated areas will get more red, crusted over the next few days. There might be some blistering. Apply vaseline to the treated area for the next week to help it heal fully. Do not pick at the area. Return in 3-4 weeks for another round of liquid nitrogen treatment if lesion(s)  fails to fully resolve.           Scribe Attestation    I,:  Yoel Trotter am acting as a scribe while in the presence of the attending physician.:       I,:  Fermín Bowers MD personally performed the services described in this documentation    as scribed in my presence.:

## 2023-12-12 NOTE — PATIENT INSTRUCTIONS
HISTORY OF SQUAMOUS CELL CARCINOMA     Assessment and Plan:  Based on a thorough discussion of this condition and the management approach to it (including a comprehensive discussion of the known risks, side effects and potential benefits of treatment), the patient (family) agrees to implement the following specific plan:  Monitor for reoccurrence     How can SCC be prevented? The most important way to prevent SCC is to avoid sunburn. This is especially important in childhood and early life. Fair skinned individuals and those with a personal or family history of BCC should protect their skin from sun exposure daily, year-round and lifelong. Stay indoors or under the shade in the middle of the day   Wear covering clothing   Apply high protection factor SPF50+ broad-spectrum sunscreens generously to exposed skin if outdoors   Avoid indoor tanning (sun beds, solaria)      What is the outlook for SCC? Most SCC are cured by treatment. Cure is most likely if treatment is undertaken when the lesion is small. A small percent of SCC's can spread to lymph  nodes and long term monitoring is indicated. They are also at increased risk of other skin cancers, especially melanoma. Regular self-skin examinations and long-term annual skin checks by an experienced health professional are recommended. HISTORY OF BASAL CELL CARCINOMA    Assessment and Plan:  Based on a thorough discussion of this condition and the management approach to it (including a comprehensive discussion of the known risks, side effects and potential benefits of treatment), the patient (family) agrees to implement the following specific plan:  Use a moisturizer + sunscreen "combo" product such as Neutrogena Daily Defense SPF 50+ or CeraVe AM at least three times a day. Monitor for changes or reoccurrence     How can basal cell carcinoma be prevented? The most important way to prevent BCC is to avoid sunburn.  This is especially important in childhood and early life. Fair skinned individuals and those with a personal or family history of BCC should protect their skin from sun exposure daily, year-round and lifelong. Stay indoors or under the shade in the middle of the day   Wear covering clothing   Apply high protection factor SPF50+ broad-spectrum sunscreens generously to exposed skin if outdoors   Avoid indoor tanning (sun beds, solaria)  Oral nicotinamide (vitamin B3) in a dose of 500 mg twice daily may reduce the number and severity of BCCs. What is the outlook for basal cell carcinoma? Most BCCs are cured by treatment. Cure is most likely if treatment is undertaken when the lesion is small. About 50% of people with BCC develop a second one within 3 years of the first. They are also at increased risk of other skin cancers, especially melanoma. Regular self-skin examinations and long-term annual skin checks by an experienced health professional are recommended. SOLOMON ANGIOMAS     Assessment and Plan:  Based on a thorough discussion of this condition and the management approach to it (including a comprehensive discussion of the known risks, side effects and potential benefits of treatment), the patient (family) agrees to implement the following specific plan:  Reassure benign        SEBORRHEIC KERATOSIS; NON-INFLAMED     Assessment and Plan:  Based on a thorough discussion of this condition and the management approach to it (including a comprehensive discussion of the known risks, side effects and potential benefits of treatment), the patient (family) agrees to implement the following specific plan:  Reassure benign  Use sun protection. Apply SPF 30 or higher at least three times a day. Wear sun protecting clothing and hats.         SOLAR LENTIGINES   OTHER SKIN CHANGES DUE TO CHRONIC EXPOSURE TO NONIONIZING RADIATION        Assessment and Plan:  Based on a thorough discussion of this condition and the management approach to it (including a comprehensive discussion of the known risks, side effects and potential benefits of treatment), the patient (family) agrees to implement the following specific plan:  Reassure benign  Use sun protection. Apply SPF 30 or higher at least three times a day. Wear sun protecting clothing and hats. MULTIPLE MELANOCYTIC NEVI ("Moles")     Assessment and Plan:  Based on a thorough discussion of this condition and the management approach to it (including a comprehensive discussion of the known risks, side effects and potential benefits of treatment), the patient (family) agrees to implement the following specific plan:  Reassure benign  Monitor for changes  Use sun protection. Apply SPF 30 or higher at least three times a day. Wear sun protecting clothing and hats. Worrisome signs of skin malignancy discussed, questions answered. Regular self-skin check discussed. Advised to call or return to office if patient notices any spots of concern, rapidly growing/changing lesions, bleeding lesions, non-healing lesions. Advised regular SPF use. VASCULAR BLUSH     Assessment and Plan:  Based on a thorough discussion of this condition and the management approach to it (including a comprehensive discussion of the known risks, side effects and potential benefits of treatment), the patient (family) agrees to implement the following specific plan:  Reassured benign, continue to monitor for changes. Follow up in 6 months     ACTINIC KERATOSES    Assessment and Plan:  Based on a thorough discussion of this condition and the management approach to it (including a comprehensive discussion of the known risks, side effects and potential benefits of treatment), the patient (family) agrees to implement the following specific plan:  Treated with cryotherapy today; written and verbal consent obtained       Patient instructions: Your pre-cancerous lesions (called actinic keratosis) were treated with liquid nitrogen today.  The treated areas will get more red, crusted over the next few days. There might be some blistering. Apply vaseline to the treated area for the next week to help it heal fully. Do not pick at the area. Return in 3-4 weeks for another round of liquid nitrogen treatment if lesion(s)  fails to fully resolve.

## 2024-04-04 ENCOUNTER — OFFICE VISIT (OUTPATIENT)
Dept: DERMATOLOGY | Facility: CLINIC | Age: 64
End: 2024-04-04
Payer: COMMERCIAL

## 2024-04-04 VITALS — TEMPERATURE: 98.9 F | BODY MASS INDEX: 24.51 KG/M2 | WEIGHT: 134 LBS

## 2024-04-04 DIAGNOSIS — D48.5 NEOPLASM OF UNCERTAIN BEHAVIOR OF SKIN: Primary | ICD-10-CM

## 2024-04-04 PROCEDURE — 11102 TANGNTL BX SKIN SINGLE LES: CPT

## 2024-04-04 PROCEDURE — 99214 OFFICE O/P EST MOD 30 MIN: CPT

## 2024-04-04 PROCEDURE — 88305 TISSUE EXAM BY PATHOLOGIST: CPT | Performed by: STUDENT IN AN ORGANIZED HEALTH CARE EDUCATION/TRAINING PROGRAM

## 2024-04-04 NOTE — PATIENT INSTRUCTIONS
"III. POST-PROCEDURAL CARE (WHAT YOU WILL NEED TO DO \"AFTER THE BIOPSY\" TO OPTIMIZE HEALING)    Keep the area clean and dry.  Try NOT to remove the bandage or get it wet for the first 24 hours.    Gently clean the area and apply surgical ointment (such as Vaseline petrolatum ointment, which is available \"over the counter\" and not a prescription) to the biopsy site for up to 2 weeks straight.  This acts to protect the wound from the outside world.      Sutures are not usually placed in this procedure.  If any sutures were placed, return for suture removal as instructed (generally 1 week for the face, 2 weeks for the body).      Take Acetaminophen (Tylenol) for discomfort, if no contraindications.  Ibuprofen or aspirin could make bleeding worse.    Call our office immediately for signs of infection: fever, chills, increased redness, warmth, tenderness, discomfort/pain, or pus or foul smell coming from the wound.    WHAT TO DO IF THERE IS ANY BLEEDING?  If a small amount of bleeding is noticed, place a clean cloth over the area and apply firm pressure for ten minutes.  Check the wound after 10 minutes of direct pressure.  If bleeding persists, try one more time for an additional 10 minutes of direct pressure on the area.  If the bleeding becomes heavier or does not stop after the second attempt, or if you have any other questions about this procedure, then please call your [facility]'s Dermatologist by calling [phone] (SKIN).     I hereby acknowledge that I have reviewed and verified the site with my Dermatologist and have requested and authorized my Dermatologist to proceed with the procedure.  "

## 2024-04-04 NOTE — PROGRESS NOTES
"[facility] Dermatology Clinic Note     Patient Name: Michelle Blanchard  Encounter Date: 04/04/24     Have you been cared for by a [facility] Dermatologist in the last 3 years and, if so, which description applies to you?    Yes.  I have been here within the last 3 years, and my medical history has NOT changed since that time.  I am FEMALE/of child-bearing potential.    REVIEW OF SYSTEMS:  Have you recently had or currently have any of the following? No changes in my recent health.   PAST MEDICAL HISTORY:  Have you personally ever had or currently have any of the following?  If \"YES,\" then please provide more detail. No changes in my medical history.   HISTORY OF IMMUNOSUPPRESSION: Do you have a history of any of the following:  Systemic Immunosuppression such as Diabetes, Biologic or Immunotherapy, Chemotherapy, Organ Transplantation, Bone Marrow Transplantation?  No     Answering \"YES\" requires the addition of the dotphrase \"IMMUNOSUPPRESSED\" as the first diagnosis of the patient's visit.   FAMILY HISTORY:  Any \"first degree relatives\" (parent, brother, sister, or child) with the following?    No changes in my family's known health.   PATIENT EXPERIENCE:    Do you want the Dermatologist to perform a COMPLETE skin exam today including a clinical examination under the \"bra and underwear\" areas?  NO  If necessary, do we have your permission to call and leave a detailed message on your Preferred Phone number that includes your specific medical information?  Yes      No Known Allergies   Current Outpatient Medications:     Cyanocobalamin (VITAMIN B 12 PO), Take 500 mg by mouth daily, Disp: , Rfl:     estradiol-norethindrone (ACTIVELLA) 1-0.5 MG per tablet, Take 1 tablet by mouth daily, Disp: , Rfl:     fexofenadine (ALLEGRA) 180 MG tablet, Take 180 mg by mouth daily as needed, Disp: , Rfl:     fluorouracil (EFUDEX) 5 % cream, Apply topically 2 (two) times a day, Disp: 40 g, Rfl: 0    fluticasone (FLONASE) 50 mcg/act " "nasal spray, 1 spray into each nostril daily, Disp: , Rfl:     levothyroxine 88 mcg tablet, Take 88 mcg by mouth daily, Disp: , Rfl:     Magnesium Gluconate 550 MG TABS, Take 1,000 mg by mouth daily, Disp: , Rfl:     meloxicam (MOBIC) 15 mg tablet, Take 15 mg by mouth daily, Disp: , Rfl:     metroNIDAZOLE (METROCREAM) 0.75 % cream, Apply topically twice daily, Disp: 45 g, Rfl: 6    Multiple Vitamins-Minerals (CENTRUM SILVER 50+WOMEN PO), Take by mouth in the morning, Disp: , Rfl:     olopatadine (PATANOL) 0.1 % ophthalmic solution, Apply 1 drop to eye if needed, Disp: , Rfl:     pantoprazole (PROTONIX) 40 mg tablet, Take 40 mg by mouth 2 (two) times a day, Disp: , Rfl:     Sulfacetamide Sodium-Sulfur 9.8-4.8 % LIQD, Wash daily, Disp: 1 Bottle, Rfl: 6          Whom besides the patient is providing clinical information about today's encounter?   NO ADDITIONAL HISTORIAN (patient alone provided history)    Physical Exam and Assessment/Plan by Diagnosis:        NEOPLASM OF UNCERTAIN BEHAVIOR OF SKIN    Physical Exam:  (Anatomic Location); (Size and Morphological Description); (Differential Diagnosis):  Left lateral foot 1.2 cm x 0.6 cm pink papule with overly crust DX SCC vs BCC vs Keratosis     Additional History of Present Condition:  Lesion on the left foot present for 4 years. No itching or bleeding. It gets irritated with friction from shoes. Patient has a personal history of basal cell carcinoma.     Assessment and Plan:  I have discussed with the patient that a sample of skin via a \"skin biopsy” would be potentially helpful to further make a specific diagnosis under the microscope.  Based on a thorough discussion of this condition and the management approach to it (including a comprehensive discussion of the known risks, side effects and potential benefits of treatment), the patient (family) agrees to implement the following specific plan:    Procedure:  Skin Biopsy.  After a thorough discussion of treatment " options and risk/benefits/alternatives (including but not limited to local pain, scarring, dyspigmentation, blistering, possible superinfection, and inability to confirm a diagnosis via histopathology), verbal and written consent were obtained and portion of the rash was biopsied for tissue sample.  See below for consent that was obtained from patient and subsequent Procedure Note.  Will call patient with results.     PROCEDURE TANGENTIAL (SHAVE) BIOPSY NOTE:    Performing Physician:  Marah GUERRA  Anatomic Location; Clinical Description with size (cm); Pre-Op Diagnosis:   Left lateral foot 1.2 cm x 0.6 cm pink papule with overly crust DX SCC vs BCC vs Keratosis   Post-op diagnosis: Same     Local anesthesia: 2% Xylocaine with epi     Topical anesthesia: None    Hemostasis: Aluminum chloride       After obtaining informed consent  at which time there was a discussion about the purpose of biopsy  and low risks of infection and bleeding.  The area was prepped and draped in the usual fashion. Anesthesia was obtained with 1% lidocaine with epinephrine. A shave biopsy to an appropriate sampling depth was obtained by Shave (Dermablade or 15 blade) The resulting wound was covered with surgical ointment and bandaged appropriately.     The patient tolerated the procedure well without complications and was without signs of functional compromise.      Specimen has been sent for review by Dermatopathology.    Standard post-procedure care has been explained and has been included in written form within the patient's copy of Informed Consent.    INFORMED CONSENT DISCUSSION AND POST-OPERATIVE INSTRUCTIONS FOR PATIENT    I.  RATIONALE FOR PROCEDURE  I understand that a skin biopsy allows the Dermatologist to test a lesion or rash under the microscope to obtain a diagnosis.  It usually involves numbing the area with numbing medication and removing a small piece of skin; sometimes the area will be closed with sutures. In this  "specific procedure, sutures are not usually needed.  If any sutures are placed, then they are usually need to be removed in 2 weeks or less.    I understand that my Dermatologist recommends that a skin \"shave\" biopsy be performed today.  A local anesthetic, similar to the kind that a dentist uses when filling a cavity, will be injected with a very small needle into the skin area to be sampled.  The injected skin and tissue underneath \"will go to sleep” and become numb so no pain should be felt afterwards.  An instrument shaped like a tiny \"razor blade\" (shave biopsy instrument) will be used to cut a small piece of tissue and skin from the area so that a sample of tissue can be taken and examined more closely under the microscope.  A slight amount of bleeding will occur, but it will be stopped with direct pressure and a pressure bandage and any other appropriate methods.  I understands that a scar will form where the wound was created.  Surgical ointment will be applied to help protect the wound.  Sutures are not usually needed.    II.  RISKS AND POTENTIAL COMPLICATIONS   I understand the risks and potential complications of a skin biopsy include but are not limited to the following:  Bleeding  Infection  Pain  Scar/keloid  Skin discoloration  Incomplete Removal  Recurrence  Nerve Damage/Numbness/Loss of Function  Allergic Reaction to Anesthesia  Biopsies are diagnostic procedures and based on findings additional treatment or evaluation may be required  Loss or destruction of specimen resulting in no additional findings    My Dermatologist has explained to me the nature of the condition, the nature of the procedure, and the benefits to be reasonably expected compared with alternative approaches.  My Dermatologist has discussed the likelihood of major risks or complications of this procedure including the specific risks listed above, such as bleeding, infection, and scarring/keloid.  I understand that a scar is " "expected after this procedure.  I understand that my physician cannot predict if the scar will form a \"keloid,\" which extends beyond the borders of the wound that is created.  A keloid is a thick, painful, and bumpy scar.  A keloid can be difficult to treat, as it does not always respond well to therapy, which includes injecting cortisone directly into the keloid every few weeks.  While this usually reduces the pain and size of the scar, it does not eliminate it.      I understand that photographs may be taken before and after the procedure.  These will be maintained as part of the medical providers confidential records and may not be made available to me.  I further authorize the medical provider to use the photographs for teaching purposes or to illustrate scientific papers, books, or lectures if in his/her judgment, medical research, education, or science may benefit from its use.    I have had an opportunity to fully inquire about the risks and benefits of this procedure and its alternatives.   I have been given ample time and opportunity to ask questions and to seek a second opinion if I wished to do so.  I acknowledge that there have specifically been no guarantees as to the cosmetic results from the procedure.  I am aware that with any procedure there is always the possibility of an unexpected complication.    III. POST-PROCEDURAL CARE (WHAT YOU WILL NEED TO DO \"AFTER THE BIOPSY\" TO OPTIMIZE HEALING)    Keep the area clean and dry.  Try NOT to remove the bandage or get it wet for the first 24 hours.    Gently clean the area and apply surgical ointment (such as Vaseline petrolatum ointment, which is available \"over the counter\" and not a prescription) to the biopsy site for up to 2 weeks straight.  This acts to protect the wound from the outside world.      Sutures are not usually placed in this procedure.  If any sutures were placed, return for suture removal as instructed (generally 1 week for the face, 2 " weeks for the body).      Take Acetaminophen (Tylenol) for discomfort, if no contraindications.  Ibuprofen or aspirin could make bleeding worse.    Call our office immediately for signs of infection: fever, chills, increased redness, warmth, tenderness, discomfort/pain, or pus or foul smell coming from the wound.    WHAT TO DO IF THERE IS ANY BLEEDING?  If a small amount of bleeding is noticed, place a clean cloth over the area and apply firm pressure for ten minutes.  Check the wound after 10 minutes of direct pressure.  If bleeding persists, try one more time for an additional 10 minutes of direct pressure on the area.  If the bleeding becomes heavier or does not stop after the second attempt, or if you have any other questions about this procedure, then please call your [facility]'s Dermatologist by calling [phone] (SKIN).     I hereby acknowledge that I have reviewed and verified the site with my Dermatologist and have requested and authorized my Dermatologist to proceed with the procedure.        Scribe Attestation      I,:  Yoav Laguna am acting as a scribe while in the presence of the attending physician.:       I,:  Marah Reyna PA-C personally performed the services described in this documentation    as scribed in my presence.:            Marah Reyna PA-C

## 2024-04-04 NOTE — PROGRESS NOTES
[facility] Dermatology Clinic Note     Patient Name: Michelle Blanchard  Encounter Date: ***     Have you been cared for by a [facility] Dermatologist in the last 3 years and, if so, which description applies to you?    {Dermclinicnote_seeninlast3years:90357}    Whom besides the patient is providing clinical information about today's encounter?   {DERM Element #2_Assessment Requiring an Independent Historian (Amount or Complexity of Data to be Reviewed and Analyzed):74141}    Physical Exam and Assessment/Plan by Diagnosis:            Scribe Attestation      I,:   am acting as a scribe while in the presence of the attending physician.:       I,:   personally performed the services described in this documentation    as scribed in my presence.:

## 2024-04-08 ENCOUNTER — TELEPHONE (OUTPATIENT)
Dept: DERMATOLOGY | Facility: CLINIC | Age: 64
End: 2024-04-08

## 2024-04-08 DIAGNOSIS — D04.72 SQUAMOUS CELL CARCINOMA IN SITU (SCCIS) OF SKIN OF LEFT LOWER LEG: Primary | ICD-10-CM

## 2024-04-08 DIAGNOSIS — D04.70: ICD-10-CM

## 2024-04-08 PROCEDURE — 88305 TISSUE EXAM BY PATHOLOGIST: CPT | Performed by: STUDENT IN AN ORGANIZED HEALTH CARE EDUCATION/TRAINING PROGRAM

## 2024-04-08 NOTE — TELEPHONE ENCOUNTER
Rec'd call from patient. Lennie unavailable. Patient is aware that Lennie will return her call at her earliest convenience.    Patient verbalized understanding.

## 2024-04-08 NOTE — RESULT ENCOUNTER NOTE
DERMATOPATHOLOGY RESULT NOTE    Results reviewed by ordering physician.  Results reviewed by myself and Dr. 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 This is malignant. Moh's surgery indicated in addition to 6 month skin exams. Patient already scheduled for a skin exam with Dr. Gregory on 8/8/2024. Called patient and personally discussed results and plan. She expressed understanding, all questions were answered.     Instructions for Clinical Derm Team:   (remember to route Result Note to appropriate staff):    Refer patient to Mohs for surgery.     Result & Plan by Specimen:    Specimen A: malignant, SQUAMOUS CELL CARCINOMA IN SITU of left lateral foot.   Plan: MOHs,  skin exams every 6 months- patient scheduled with Dr. Gregory for skin exam on 8/8/2024.      [id number]  Order: [id number]   Status: Final result      Visible to patient: Yes (not seen)      Dx: Neoplasm of uncertain behavior of skin    0 Result Notes     Component   Case Report  Surgical Pathology Report                         Case: [id number]                                  Authorizing Provider:  Marah Reyna PA-C     Collected:           04/04/2024 [facility]              Ordering Location:     [facility]      Received:            04/04/2024 [address] [place] [address]                                                                Pathologist:           Sacha Awad MD                                                          Specimen:    Skin, Other, A. Left lateral foot                                                        Final Diagnosis  A. Skin, left lateral foot, shave biopsy:    SQUAMOUS CELL CARCINOMA IN SITU; extends to the tissue edges.      Electronically signed by Sacha Awad MD on 4/8/2024 at  1:08 PM  Additional Information   All reported additional testing was performed with appropriately reactive controls.  These tests were developed and their  "performance characteristics determined by [facility] or appropriate performing facility, though some tests may be performed on tissues which have not been validated for performance characteristics (such as staining performed on alcohol exposed cell blocks and decalcified tissues).  Results should be interpreted with caution and in the context of the patients' clinical condition. These tests may not be cleared or approved by the U.S. Food and Drug Administration, though the FDA has determined that such clearance or approval is not necessary. These tests are used for clinical purposes and they should not be regarded as investigational or for research. This laboratory has been approved by CLIA 88, designated as a high-complexity laboratory and is qualified to perform these tests.  .  Gross Description   A. The specimen is received in formalin, labeled with the patient's name and hospital number, and is designated \" left lateral foot\".  The specimen consists of 1 tan nonhairbearing shave of skin measuring 0.6 x 0.5 x 0.1 cm.  The surface is partially keratotic.  The margin of resection is inked green and the surface tips are inked red.  Entirely submitted. One cassette, bisected longitudinally, between sponges.    Note: The estimated total formalin fixation time based upon information provided by the submitting clinician and the standard processing schedule is under 72 hours.  MSequino  Clinical Information   ATTENTION:  DERMPATH GROUP    SPECIMEN A; Skin; Anatomic Location: Left lateral foot ; Procedure/Protocol: Skin Specimen (submit in FORMALIN):Tangential Biopsy (includes shave, scoop, saucerization, curette) (CPT 91931; each additional tangential biopsy is CPT 06948)  63 y.o. year old  Female with a Morphological Description:1.2 cm x 0.6 cm pink papule with overly crust  Differential Diagnosis and/or Specific Clinical Question:SCC vs BCC vs keratosis  Resulting Agency [facility] " 36.8 LAB          Specimen Collected: 04/04/24  4:57 PM Last Resulted: 04/08/24  1:08 PM

## 2024-04-09 NOTE — TELEPHONE ENCOUNTER
Pre- operative Mohs Telephone Scheduling Note    Do you have a pacemaker, defibrillator, spinal or brain stimulator? no    Do you take antibiotics before skin or dental procedures? no  If yes, will likely require pre-operative antibiotics. Ask  the patient why they take the antibiotics (usually because of joint replacement).    Do you have a history of a joint replacements within the past 2 years? no   If yes, will likely require pre-operative antibiotics. Ask if orthopaedic surgeon has prescribed pre-operative antibiotics to take before procedures/dental work?    Do you take any OTC medications that thin your blood (Aspirin, Aleve, Ibuprofen) or supplements that thin your blood (fish oil, garlic, vitamin E, Ginko Biloba)? no    Do you take any prescribed medications that thin your blood (Coumadin, Plavix, Xarelto, Eliquis or another prescribed blood thinner)? no    Do you have an allergy to lidocaine or epinephrine? no    Do you have allergies to Iodine? no    Do you wear a lidocaine patch? no    Have you ever been diagnosed with HIV, AIDS, Hep B and Hep C? no    Do you use a cane, walker or wheelchair? no    Is the patient from a nursing home? no If yes, Is there any special accommodations that is needed for patient n/a    Do you smoke? no      If yes,  patient to try and stop 2 days before surgery and 7 days after the surgery. Minimizing smoking as much as possible during this time will improve healing and the cosmetic result after surgery.    Do you use supplemental oxygen? If so, how many liters and can you be off it for a short period of time? N/a    Date scheduled: June 26, 2024 at 9 am with Dr. Gregory    Coordination of Care with other provider (Oculoplastics, Plastics, ENT) required? no   IF YES, PLEASE FORWARD TO APPROPRIATE PERSONNEL TO HELP COORDINATE.    Are there remaining tumors to be scheduled? no    Was Prior Authorization obtained? No (please use .[email or web address] to document prior auth)

## 2024-05-30 ENCOUNTER — EVALUATION (OUTPATIENT)
Dept: PHYSICAL THERAPY | Facility: REHABILITATION | Age: 64
End: 2024-05-30
Payer: COMMERCIAL

## 2024-05-30 DIAGNOSIS — M47.812 FACET ARTHROPATHY, CERVICAL: ICD-10-CM

## 2024-05-30 DIAGNOSIS — M54.2 CERVICALGIA: Primary | ICD-10-CM

## 2024-05-30 DIAGNOSIS — M75.41 IMPINGEMENT SYNDROME OF RIGHT SHOULDER: ICD-10-CM

## 2024-05-30 PROCEDURE — 97110 THERAPEUTIC EXERCISES: CPT | Performed by: PHYSICAL THERAPIST

## 2024-05-30 PROCEDURE — 97162 PT EVAL MOD COMPLEX 30 MIN: CPT | Performed by: PHYSICAL THERAPIST

## 2024-05-30 PROCEDURE — 97140 MANUAL THERAPY 1/> REGIONS: CPT | Performed by: PHYSICAL THERAPIST

## 2024-05-30 NOTE — LETTER
May 30, 2024    Fiona Parekh MD  [address]  [address]  [facility] [address]    Patient: Michelle Blanchard   YOB: 1960   Date of Visit: 5/30/2024     Encounter Diagnosis     ICD-10-CM    1. Cervicalgia  M54.2       2. Facet arthropathy, cervical  M47.812       3. Impingement syndrome of right shoulder  M75.41           Dear Dr. Parekh:    Thank you for your recent referral of Michelle Blanchard. Please review the attached evaluation summary from Michelle's recent visit.     Please verify that you agree with the plan of care by signing the attached order.     If you have any questions or concerns, please do not hesitate to call.     I sincerely appreciate the opportunity to share in the care of one of your patients and hope to have another opportunity to work with you in the near future.       Sincerely,    Virginia Walker, PT      Referring Provider:      I certify that I have read the below Plan of Care and certify the need for these services furnished under this plan of treatment while under my care.                    Fiona Parekh MD  [address]  [address]  [facility] [address]  Via Fax: [phone]          No notes on file

## 2024-05-30 NOTE — PROGRESS NOTES
PT Evaluation     Today's date: 2024  Patient name: Michelle lBanchard  : 1960  MRN: [id number]  Referring provider: Fiona Parekh MD  Dx:   Encounter Diagnosis     ICD-10-CM    1. Cervicalgia  M54.2       2. Facet arthropathy, cervical  M47.812       3. Impingement syndrome of right shoulder  M75.41           Start Time: 0730  Stop Time: 0810  Total time in clinic (min): 40 minutes    Assessment  Impairments: abnormal muscle firing, abnormal muscle tone, abnormal or restricted ROM, activity intolerance, impaired physical strength, lacks appropriate home exercise program, pain with function and activity limitations  Symptom irritability: moderate    Assessment details: 62 y/o right hand dominant female with c/o right sided neck pain and right shoulder pain.   About six years ago, she experienced HA's and was treated successfully with a bout of PT.   About two years ago, she c/o right shoulder pain.  She denies trauma.  She was treated successfully with a bout of PT and an injection.   Recently, she states her right sided neck pain and right shoulder pain have been progressing. She denies 5 D's, 3 N's, or other red flag sx's.   She wakes up with 7/10 sx's in the AM and her sx's improve t/o the day.  By the end of the day, she would rate the sx's as a nagging 2/10.   She takes Meloxicam daily in the AM.   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   Her goals include to wake up with less pain in the morning and to perform her activities with less pain.   Understanding of Dx/Px/POC: good     Prognosis: good    Goals  ST - I with HEP  2 - cx retraction > neutral  3 - cx extension > 50%  LT - FOTO (neck) > 61  2 - FOTO (shoulder) > 69  3 - ride bike > 10 miles with < 4/10 neck pain  4 - work at food bank (3 hours) with < 4/10 neck pain    Plan  Patient would benefit from: skilled physical  therapy    Planned therapy interventions: activity modification, joint mobilization, manual therapy, neuromuscular re-education, postural training, home exercise program, strengthening, stretching, therapeutic activities and therapeutic exercise    Frequency: 1x week  Duration in weeks: 12  Treatment plan discussed with: patient    Subjective Evaluation    Quality of life: good    Patient Goals  Patient goals for therapy: decreased pain, increased motion, increased strength, independence with ADLs/IADLs and return to sport/leisure activities    Pain  At best pain ratin  At worst pain ratin  Quality: tight, dull ache, discomfort and pulling  Relieving factors: medications  Aggravating factors: lifting and overhead activity    Social Support  Lives with: spouse    Hand dominance: right      Diagnostic Tests  X-ray: abnormal  Treatments  Previous treatment: physical therapy  Current treatment: medication    Objective     Concurrent Complaints  Negative for faints, trouble swallowing, difficulty breathing, visual change and history of trauma    Palpation     Right   Hypertonic in the pectoralis major and upper trapezius.   Tenderness of the pectoralis major and upper trapezius.     Neurological Testing     Sensation   Cervical/Thoracic   Left   Intact: pin prick    Right   Intact: pin prick    Reflexes   Left   Biceps (C5/C6): normal (2+)  Brachioradialis (C6): normal (2+)    Right   Biceps (C5/C6): normal (2+)  Brachioradialis (C6): normal (2+)    Active Range of Motion   Cervical/Thoracic Spine       Cervical    Subcranial retraction:  with pain   Restriction level: maximal  Extension:  with pain Restriction level: moderate  Left lateral flexion:  Restriction level: moderate  Right lateral flexion:  with pain Restriction level moderate  Left rotation:  with pain Restriction level: moderate  Right rotation:  with pain Restriction level: moderate    Strength/Myotome Testing   Cervical Spine     Left   Normal  strength    Right   Normal strength    General Comments:      Cervical/Thoracic Comments  FOTO (shoulder) = 65  FOTO (neck) = 55           Precautions: hx CA      Manuals 05/30                         Supine sustained pressure right UT LMR            Supine inf GH mobs LMR - gr 2                         Neuro Re-Ed 05/30                                                                                                       Ther Ex 05/30            Cx retraction 3x5            HEP LMR            Pt education Extensive - LMR                                                                             Ther Activity                                       Gait Training                                       Modalities

## 2024-06-04 ENCOUNTER — OFFICE VISIT (OUTPATIENT)
Dept: PHYSICAL THERAPY | Facility: REHABILITATION | Age: 64
End: 2024-06-04
Payer: COMMERCIAL

## 2024-06-04 DIAGNOSIS — M47.812 FACET ARTHROPATHY, CERVICAL: ICD-10-CM

## 2024-06-04 DIAGNOSIS — M75.41 IMPINGEMENT SYNDROME OF RIGHT SHOULDER: ICD-10-CM

## 2024-06-04 DIAGNOSIS — M54.2 CERVICALGIA: Primary | ICD-10-CM

## 2024-06-04 PROCEDURE — 97112 NEUROMUSCULAR REEDUCATION: CPT | Performed by: PHYSICAL THERAPIST

## 2024-06-04 PROCEDURE — 97140 MANUAL THERAPY 1/> REGIONS: CPT | Performed by: PHYSICAL THERAPIST

## 2024-06-04 NOTE — PROGRESS NOTES
Daily Note     Today's date: 2024  Patient name: Michelle Blanchard  : 1960  MRN: [id number]  Referring provider: Fiona Parekh MD  Dx:   Encounter Diagnosis     ICD-10-CM    1. Cervicalgia  M54.2       2. Facet arthropathy, cervical  M47.812       3. Impingement syndrome of right shoulder  M75.41           Start Time: 1530  Stop Time: 1600  Total time in clinic (min): 30 minutes    Subjective: Pt was sore < 24 hours after the IE.     Objective: See treatment diary below  HEP updated with q-ped cx retraction and supine banded pull a parts. Green band issued.     Assessment: Tolerated treatment well. Patient exhibited good technique with therapeutic exercises and would benefit from continued PT    Plan: Continue per plan of care.      Precautions: hx CA      Manuals                         Supine sustained pressure right UT LMR LMR           Supine inf GH mobs LMR - gr 2                         Neuro Re-Ed                           Q-ped cx retraction  Grn 2x5           Supine banded pull a parts  Orange x15, grn x 15           HEP  LMR                                                  Ther Ex             Cx retraction 3x5            HEP LMR            Pt education Extensive - LMR                                                                             Ther Activity                                       Gait Training                                       Modalities

## 2024-06-06 ENCOUNTER — TELEPHONE (OUTPATIENT)
Age: 64
End: 2024-06-06

## 2024-06-06 NOTE — TELEPHONE ENCOUNTER
Pt cancelled NP appt 9/6 with My Villanueva. Called pt & offered first available with MD, Dr Lane 9/25. Pt declined & stated they scheduled with their existing obgyn for next week but plan on transferring care to [facility] in the future.

## 2024-06-24 ENCOUNTER — OFFICE VISIT (OUTPATIENT)
Dept: URGENT CARE | Facility: CLINIC | Age: 64
End: 2024-06-24
Payer: COMMERCIAL

## 2024-06-24 VITALS
RESPIRATION RATE: 18 BRPM | OXYGEN SATURATION: 97 % | DIASTOLIC BLOOD PRESSURE: 88 MMHG | SYSTOLIC BLOOD PRESSURE: 138 MMHG | HEART RATE: 82 BPM

## 2024-06-24 DIAGNOSIS — S29.012A STRAIN OF THORACIC BACK REGION: Primary | ICD-10-CM

## 2024-06-24 PROCEDURE — 99213 OFFICE O/P EST LOW 20 MIN: CPT

## 2024-06-24 RX ORDER — METHOCARBAMOL 500 MG/1
500 TABLET, FILM COATED ORAL
Qty: 10 TABLET | Refills: 0 | Status: SHIPPED | OUTPATIENT
Start: 2024-06-24 | End: 2024-06-24

## 2024-06-24 RX ORDER — METHOCARBAMOL 500 MG/1
500 TABLET, FILM COATED ORAL
Qty: 10 TABLET | Refills: 0 | Status: SHIPPED | OUTPATIENT
Start: 2024-06-24

## 2024-06-25 ENCOUNTER — OFFICE VISIT (OUTPATIENT)
Dept: PHYSICAL THERAPY | Facility: REHABILITATION | Age: 64
End: 2024-06-25
Payer: COMMERCIAL

## 2024-06-25 DIAGNOSIS — M75.41 IMPINGEMENT SYNDROME OF RIGHT SHOULDER: ICD-10-CM

## 2024-06-25 DIAGNOSIS — M47.812 FACET ARTHROPATHY, CERVICAL: ICD-10-CM

## 2024-06-25 DIAGNOSIS — M54.2 CERVICALGIA: Primary | ICD-10-CM

## 2024-06-25 PROCEDURE — 97110 THERAPEUTIC EXERCISES: CPT | Performed by: PHYSICAL THERAPIST

## 2024-06-25 PROCEDURE — 97140 MANUAL THERAPY 1/> REGIONS: CPT | Performed by: PHYSICAL THERAPIST

## 2024-06-25 NOTE — PATIENT INSTRUCTIONS
Continue current pain management regimen.  Robaxin before bedtime as needed.    Heating pad / moist heat applications.    Gentle stretching after heat.    Follow-up with PCP in 3-5 days.    Go to the ED for any severely worsening symptoms.

## 2024-06-25 NOTE — PROGRESS NOTES
Daily Note     Today's date: 2024  Patient name: Michelle Blanchard  : 1960  MRN: [id number]  Referring provider: Fiona Parekh MD  Dx:   Encounter Diagnosis     ICD-10-CM    1. Cervicalgia  M54.2       2. Facet arthropathy, cervical  M47.812       3. Impingement syndrome of right shoulder  M75.41           Start Time: 1400  Stop Time: 1440  Total time in clinic (min): 40 minutes    Subjective: After the last PT session, she states she woke up the next morning with the least amount of pain she's had in over a year. The decreased sx's lasted up until yesterday until she was playing pickleball.  She extended her arm when reaching for a ball and experienced a sharp pain in her thorax. She went to an Urgent Care and was diagnosed with a strain.      Objective: See treatment diary below  HEP updated with 1/2 kneeling thoracic rotation stretch at wall.     Assessment: Tolerated treatment well. Patient would benefit from continued PT    Plan: Continue per plan of care.      Precautions: hx CA      Manuals           Tissue deformation - right SCM   LMR          Prone P-A u/l mid thoracic mobs   Gr 4 - T3-5 - LMR          Supine A-P mid cx mobs   Gr 1 - LMR          Tissue deformation - b/l UT   LMR          Supine sustained pressure right UT LMR LMR           Supine inf GH mobs LMR - gr 2  Gr 2 - LMR                       Neuro Re-Ed                          Q-ped cx retraction  Grn 2x5           Supine banded pull a parts  Orange x15, grn x 15           HEP  LMR                                                  Ther Ex           Cx retraction 3x5            HEP LMR  LMR          Pt education Extensive - LMR  LMR          1/2 kneeling thoracic rotation    2x5 - to right                                                              Ther Activity                                       Gait Training                                       Modalities

## 2024-06-25 NOTE — PROGRESS NOTES
[facility]        NAME: Michelle Blanchard is a 63 y.o. female  : 1960    MRN: [id number]  DATE: 2024  TIME: 9:06 AM    Assessment and Plan   Strain of thoracic back region [S29.012A]  1. Strain of thoracic back region  methocarbamol (ROBAXIN) 500 mg tablet    DISCONTINUED: methocarbamol (ROBAXIN) 500 mg tablet            Patient Instructions     Continue current pain management regimen.  Robaxin before bedtime as needed.    Heating pad / moist heat applications.    Gentle stretching after heat.    Follow-up with PCP in 3-5 days.    Go to the ED for any severely worsening symptoms.    If tests are performed, our office will contact you with results only if changes need to made to the care plan discussed with you at the visit. [facility]      Chief Complaint     Chief Complaint   Patient presents with    Back Pain     Patient has mid back pain that started this morning while playing pickleball          History of Present Illness       63-year-old female presenting with right sided thoracic back pain that started this morning while playing pickleball. States she extended her right arm to serve the ball and immediately felt a sharp stabbing pain in her back. This pain did initially radiate into right ribs however this has resolved. Takes 7.5mg meloxicam daily, took an extra 7.5mg post-injury. Also took Tylenol, used heating pad, and applied Lidoderm patch. Notes history of arthritis and stenosis in her neck but no other history of chronic back problems or pain.    Back Pain  This is a new problem. The current episode started today. The problem occurs constantly. The problem has been waxing and waning since onset. The pain is present in the thoracic spine. The quality of the pain is described as cramping and stabbing. The pain does not radiate. The pain is at a severity of 8/10. The pain is The same all the time. The symptoms are aggravated by  bending, coughing, position and twisting. Stiffness is present All day. Pertinent negatives include no abdominal pain, bladder incontinence, bowel incontinence, chest pain, dysuria, fever, headaches, leg pain, numbness, paresis, paresthesias, pelvic pain, perianal numbness, tingling, weakness or weight loss.       Review of Systems   Review of Systems   Constitutional:  Negative for chills, fever and weight loss.   Respiratory:  Negative for chest tightness and shortness of breath.    Cardiovascular:  Negative for chest pain and palpitations.   Gastrointestinal:  Negative for abdominal pain, bowel incontinence, diarrhea and vomiting.   Genitourinary:  Negative for bladder incontinence, dysuria and pelvic pain.   Musculoskeletal:  Positive for back pain. Negative for joint swelling.   Skin:  Negative for rash and wound.   Neurological:  Negative for dizziness, tingling, weakness, light-headedness, numbness, headaches and paresthesias.         Current Medications       Current Outpatient Medications:     methocarbamol (ROBAXIN) 500 mg tablet, Take 1 tablet (500 mg total) by mouth daily at bedtime as needed for muscle spasms, Disp: 10 tablet, Rfl: 0    Cyanocobalamin (VITAMIN B 12 PO), Take 500 mg by mouth daily, Disp: , Rfl:     estradiol-norethindrone (ACTIVELLA) 1-0.5 MG per tablet, Take 1 tablet by mouth daily, Disp: , Rfl:     fexofenadine (ALLEGRA) 180 MG tablet, Take 180 mg by mouth daily as needed, Disp: , Rfl:     fluorouracil (EFUDEX) 5 % cream, Apply topically 2 (two) times a day, Disp: 40 g, Rfl: 0    fluticasone (FLONASE) 50 mcg/act nasal spray, 1 spray into each nostril daily, Disp: , Rfl:     levothyroxine 88 mcg tablet, Take 88 mcg by mouth daily, Disp: , Rfl:     Magnesium Gluconate 550 MG TABS, Take 1,000 mg by mouth daily, Disp: , Rfl:     meloxicam (MOBIC) 15 mg tablet, Take 15 mg by mouth daily, Disp: , Rfl:     metroNIDAZOLE (METROCREAM) 0.75 % cream, Apply topically twice daily, Disp: 45 g, Rfl:  6    Multiple Vitamins-Minerals (CENTRUM SILVER 50+WOMEN PO), Take by mouth in the morning, Disp: , Rfl:     olopatadine (PATANOL) 0.1 % ophthalmic solution, Apply 1 drop to eye if needed, Disp: , Rfl:     pantoprazole (PROTONIX) 40 mg tablet, Take 40 mg by mouth 2 (two) times a day, Disp: , Rfl:     Sulfacetamide Sodium-Sulfur 9.8-4.8 % LIQD, Wash daily, Disp: 1 Bottle, Rfl: 6    Current Allergies     Allergies as of 06/24/2024    (No Known Allergies)            The following portions of the patient's history were reviewed and updated as appropriate: allergies, current medications, past family history, past medical history, past social history, past surgical history and problem list.     Past Medical History:   Diagnosis Date    Basal cell carcinoma Left neck    1987    Basal cell carcinoma Right lower leg    1997    Basal cell carcinoma Right neck    1997    Basal cell carcinoma Right abdomen    1992    Basal cell carcinoma (BCC) left upper and lower shoulder    1992    Basal cell carcinoma (BCC) Right upper and lower leg    1993    Basal cell carcinoma (BCC) Right upper arm    1997    Basal cell carcinoma (BCC) Right chest, right clavicle    1997    Basal cell carcinoma (BCC) Left leg, left shoulder    1998    Basal cell carcinoma (BCC) Right chest    1998    Basal cell carcinoma (BCC) Left temple    2001    Basal cell carcinoma (BCC) Left Shoulder    2005    Basal cell carcinoma (BCC) Right chest     2008    Basal cell carcinoma (BCC) Left tip of nose    2009    Basal cell carcinoma (BCC) Left medial calf    2011    Basal cell carcinoma (BCC) Left lateral calf, left shin    2012    Basal cell carcinoma (BCC) Right anterior thigh    2012    Basal cell carcinoma (BCC) Right shin    2013    Basal cell carcinoma (BCC) Right mid back x 2    2012    Basal cell carcinoma (BCC) Mid back    2017    Basal cell carcinoma (BCC) Right mid shin inferior and superior    2014    Basal cell carcinoma (BCC) Left mandible    2016     Basal cell carcinoma (BCC) Right calf    2016    Basal cell carcinoma (BCC) Right inner forearm    2012    Basal cell carcinoma (BCC) Left upper medial calf    2016    Basal cell carcinoma (BCC) Right lateral knee    2015    BCC (basal cell carcinoma of skin) 09/27/2022    Right bicep, left upper shin    Squamous cell skin cancer        Past Surgical History:   Procedure Laterality Date    MOHS SURGERY      SKIN BIOPSY         Family History   Problem Relation Age of Onset    Basal cell carcinoma Mother     Basal cell carcinoma Father     Basal cell carcinoma Sister          Medications have been verified.        Objective   /88   Pulse 82   Resp 18   SpO2 97%        Physical Exam     Physical Exam  Vitals and nursing note reviewed.   Constitutional:       General: She is not in acute distress.  HENT:      Head: Normocephalic and atraumatic.      Mouth/Throat:      Mouth: Mucous membranes are moist.   Cardiovascular:      Rate and Rhythm: Normal rate and regular rhythm.      Pulses: Normal pulses.      Heart sounds: Normal heart sounds.   Pulmonary:      Effort: Pulmonary effort is normal.      Breath sounds: Normal breath sounds.   Musculoskeletal:      Cervical back: Normal range of motion and neck supple. No pain with movement.      Thoracic back: No swelling, tenderness or bony tenderness. Decreased range of motion.      Lumbar back: Normal. Negative right straight leg raise test and negative left straight leg raise test.      Comments: 5/5 strength bilateral upper and lower extremities.   Skin:     General: Skin is warm and dry.      Capillary Refill: Capillary refill takes less than 2 seconds.   Neurological:      Mental Status: She is alert and oriented to person, place, and time.      Gait: Gait normal.

## 2024-07-08 ENCOUNTER — APPOINTMENT (OUTPATIENT)
Dept: PHYSICAL THERAPY | Facility: REHABILITATION | Age: 64
End: 2024-07-08
Payer: COMMERCIAL

## 2024-07-16 ENCOUNTER — OFFICE VISIT (OUTPATIENT)
Dept: PHYSICAL THERAPY | Facility: REHABILITATION | Age: 64
End: 2024-07-16
Payer: COMMERCIAL

## 2024-07-16 DIAGNOSIS — M47.812 FACET ARTHROPATHY, CERVICAL: ICD-10-CM

## 2024-07-16 DIAGNOSIS — M54.2 CERVICALGIA: Primary | ICD-10-CM

## 2024-07-16 DIAGNOSIS — M75.41 IMPINGEMENT SYNDROME OF RIGHT SHOULDER: ICD-10-CM

## 2024-07-16 PROCEDURE — 97110 THERAPEUTIC EXERCISES: CPT | Performed by: PHYSICAL THERAPIST

## 2024-07-16 PROCEDURE — 97140 MANUAL THERAPY 1/> REGIONS: CPT | Performed by: PHYSICAL THERAPIST

## 2024-07-16 NOTE — PROGRESS NOTES
Daily Note     Today's date: 2024  Patient name: Michelle Blanchard  : 1960  MRN: [id number]  Referring provider: Fiona Parekh MD  Dx:   Encounter Diagnosis     ICD-10-CM    1. Cervicalgia  M54.2       2. Facet arthropathy, cervical  M47.812       3. Impingement syndrome of right shoulder  M75.41           Start Time: 0815  Stop Time: 0900  Total time in clinic (min): 45 minutes    Subjective: Pt reports improvements since start of PT, but still c/o right sided neck pain.     Objective: See treatment diary below  Cx arom: flexion = wfl, extension = limited with right sided neck pain, right rot ~ 50% with right sided neck pain.     Assessment: Tolerated treatment well. Patient would benefit from continued PT    Plan: Continue per plan of care.      Precautions: hx CA      Manuals          Tissue deformation - right SCM   LMR          Prone P-A u/l mid thoracic mobs   Gr 4 - T3-5 - LMR          Supine A-P mid cx mobs   Gr 1 - LMR          SOR    LMR         Mid cx side glides to left    Gr 4 - LMR         Tissue deformation - b/l UT   LMR Right - LMR         Tissue deformation - pectorals    Right - LMR         Supine sustained pressure right UT LMR LMR  LMR         Supine inf GH mobs LMR - gr 2  Gr 2 - LMR                       Neuro Re-Ed                         Q-ped cx retraction  Grn 2x5           Supine banded pull a parts  Orange x15, grn x 15           HEP  LMR                                                  Ther Ex          Cx retraction 3x5            HEP LMR  LMR LMR         Pt education Extensive - LMR  LMR          1/2 kneeling thoracic rotation    2x5 - to right          Supine on half foam    5'         Supine on half foam with UE mvmts    2x30                                   Ther Activity                                       Gait Training                                       Modalities

## 2024-07-29 ENCOUNTER — OFFICE VISIT (OUTPATIENT)
Dept: PHYSICAL THERAPY | Facility: REHABILITATION | Age: 64
End: 2024-07-29
Payer: COMMERCIAL

## 2024-07-29 DIAGNOSIS — M47.812 FACET ARTHROPATHY, CERVICAL: ICD-10-CM

## 2024-07-29 DIAGNOSIS — M54.2 CERVICALGIA: Primary | ICD-10-CM

## 2024-07-29 DIAGNOSIS — M75.41 IMPINGEMENT SYNDROME OF RIGHT SHOULDER: ICD-10-CM

## 2024-07-29 PROCEDURE — 97112 NEUROMUSCULAR REEDUCATION: CPT | Performed by: PHYSICAL THERAPIST

## 2024-07-29 PROCEDURE — 97110 THERAPEUTIC EXERCISES: CPT | Performed by: PHYSICAL THERAPIST

## 2024-07-29 NOTE — PROGRESS NOTES
Daily Note     Today's date: 2024  Patient name: Michelle Blanchard  : 1960  MRN: [id number]  Referring provider: Fiona Parekh MD  Dx:   Encounter Diagnosis     ICD-10-CM    1. Cervicalgia  M54.2       2. Facet arthropathy, cervical  M47.812       3. Impingement syndrome of right shoulder  M75.41           Start Time: 1445  Stop Time: 1515  Total time in clinic (min): 30 minutes    Subjective: Pt reports improvement since start of PT. She used to wake up with 9/10 neck pain and states she now wakes up with 3/10 neck pain.     Objective: See treatment diary below  HEP = low trap retractions, upper cuts, shoulder er walkouts, 1/2 kneel thoracic rotation at wall. Peach, green, and cranberry bands issued.     Assessment: Tolerated treatment well. Patient would benefit from continued PT    Plan: d/c     Precautions: hx CA      Manuals         Tissue deformation - right SCM   LMR          Prone P-A u/l mid thoracic mobs   Gr 4 - T3-5 - LMR          Supine A-P mid cx mobs   Gr 1 - LMR          SOR    LMR         Mid cx side glides to left    Gr 4 - LMR         Tissue deformation - b/l UT   LMR Right - LMR         Tissue deformation - pectorals    Right - LMR         Supine sustained pressure right UT LMR LMR  LMR         Supine inf GH mobs LMR - gr 2  Gr 2 - LMR                       Neuro Re-Ed                        Q-ped cx retraction  Grn 2x5           Supine banded pull a parts  Orange x15, grn x 15           HEP  LMR           Low trap retractions with oscillations     Peach 2x15        Upper cuts     2x7 peach        Nilsa - shoulder er walkouts             Ther Ex         Cx retraction 3x5            HEP LMR  LMR LMR Update and review - LMR        Pt education Extensive - LMR  LMR          1/2 kneeling thoracic rotation    2x5 - to right          Supine on half foam    5'         Supine on half foam with UE mvmts     2x30         rows     Cran - verbal                     Ther Activity                                       Gait Training                                       Modalities

## 2024-08-01 ENCOUNTER — PROCEDURE VISIT (OUTPATIENT)
Dept: DERMATOLOGY | Facility: CLINIC | Age: 64
End: 2024-08-01
Payer: COMMERCIAL

## 2024-08-01 VITALS
OXYGEN SATURATION: 98 % | HEART RATE: 68 BPM | WEIGHT: 129 LBS | HEIGHT: 62 IN | BODY MASS INDEX: 23.74 KG/M2 | TEMPERATURE: 99 F | DIASTOLIC BLOOD PRESSURE: 78 MMHG | SYSTOLIC BLOOD PRESSURE: 118 MMHG

## 2024-08-01 DIAGNOSIS — D04.70: ICD-10-CM

## 2024-08-01 PROCEDURE — 17311 MOHS 1 STAGE H/N/HF/G: CPT | Performed by: STUDENT IN AN ORGANIZED HEALTH CARE EDUCATION/TRAINING PROGRAM

## 2024-08-01 PROCEDURE — 12042 INTMD RPR N-HF/GENIT2.6-7.5: CPT | Performed by: STUDENT IN AN ORGANIZED HEALTH CARE EDUCATION/TRAINING PROGRAM

## 2024-08-01 NOTE — PROGRESS NOTES
MOHS Procedure Note    Patient: Michelle Blanchard  : 1960  MRN: [id number]  Date: 2024    History of Present Illness: The patient is a 63 y.o. 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     Past Medical History:   Diagnosis Date    Basal cell carcinoma Left neck        Basal cell carcinoma Right lower leg        Basal cell carcinoma Right neck        Basal cell carcinoma Right abdomen        Basal cell carcinoma (BCC) left upper and lower shoulder        Basal cell carcinoma (BCC) Right upper and lower leg        Basal cell carcinoma (BCC) Right upper arm        Basal cell carcinoma (BCC) Right chest, right clavicle        Basal cell carcinoma (BCC) Left leg, left shoulder        Basal cell carcinoma (BCC) Right chest        Basal cell carcinoma (BCC) Left temple        Basal cell carcinoma (BCC) Left Shoulder    2005    Basal cell carcinoma (BCC) Right chest     2008    Basal cell carcinoma (BCC) Left tip of nose    2009    Basal cell carcinoma (BCC) Left medial calf    2011    Basal cell carcinoma (BCC) Left lateral calf, left shin    2012    Basal cell carcinoma (BCC) Right anterior thigh    2012    Basal cell carcinoma (BCC) Right shin    2013    Basal cell carcinoma (BCC) Right mid back x 2    2012    Basal cell carcinoma (BCC) Mid back    2017    Basal cell carcinoma (BCC) Right mid shin inferior and superior    2014    Basal cell carcinoma (BCC) Left mandible    2016    Basal cell carcinoma (BCC) Right calf    2016    Basal cell carcinoma (BCC) Right inner forearm    2012    Basal cell carcinoma (BCC) Left upper medial calf    2016    Basal cell carcinoma (BCC) Right lateral knee    2015    BCC (basal cell carcinoma of skin) 2022    Right bicep, left upper shin    Squamous cell skin cancer        Past Surgical History:   Procedure Laterality Date    MOHS SURGERY Left 2024     SCCIS left lateral foot- Dr. Walker    SKIN BIOPSY           Current Outpatient Medications:     fexofenadine (ALLEGRA) 180 MG tablet, Take 180 mg by mouth daily as needed, Disp: , Rfl:     fluticasone (FLONASE) 50 mcg/act nasal spray, 1 spray into each nostril daily, Disp: , Rfl:     levothyroxine 88 mcg tablet, Take 88 mcg by mouth daily, Disp: , Rfl:     Multiple Vitamins-Minerals (CENTRUM SILVER 50+WOMEN PO), Take by mouth in the morning, Disp: , Rfl:     Cyanocobalamin (VITAMIN B 12 PO), Take 500 mg by mouth daily, Disp: , Rfl:     estradiol-norethindrone (ACTIVELLA) 1-0.5 MG per tablet, Take 1 tablet by mouth daily, Disp: , Rfl:     fluorouracil (EFUDEX) 5 % cream, Apply topically 2 (two) times a day, Disp: 40 g, Rfl: 0    Magnesium Gluconate 550 MG TABS, Take 1,000 mg by mouth daily, Disp: , Rfl:     meloxicam (MOBIC) 15 mg tablet, Take 15 mg by mouth daily, Disp: , Rfl:     methocarbamol (ROBAXIN) 500 mg tablet, Take 1 tablet (500 mg total) by mouth daily at bedtime as needed for muscle spasms, Disp: 10 tablet, Rfl: 0    metroNIDAZOLE (METROCREAM) 0.75 % cream, Apply topically twice daily, Disp: 45 g, Rfl: 6    olopatadine (PATANOL) 0.1 % ophthalmic solution, Apply 1 drop to eye if needed, Disp: , Rfl:     pantoprazole (PROTONIX) 40 mg tablet, Take 40 mg by mouth 2 (two) times a day, Disp: , Rfl:     Sulfacetamide Sodium-Sulfur 9.8-4.8 % LIQD, Wash daily, Disp: 1 Bottle, Rfl: 6    No Known Allergies    Physical Exam:   Vitals:    08/01/24 0947   BP: 118/78   Pulse: 68   Temp: 99 °F (37.2 °C)   SpO2: 98%     General: Awake, Alert, Oriented x 3, Mood and affect appropriate  Respiratory: Respirations even and unlabored  Cardiovascular: Peripheral pulses intact; no edema  Musculoskeletal Exam: n/a    Skin: 1.3 cm x 0.8 cm pink patch on the left lateral foot.    Assessment: Biopsy proven to be squamous cell carcinoma in situ.    Plan: Mohs    Time of H&P Completion: 9134    MOHS Procedure Timeout      Flowsheet  Row Most Recent Value   Timeout: 1000   Patient Identity Verified: Yes   Correct Site Verified: Yes   Correct Procedure Verified: Yes            MOHS Diagnosis/Indication/Location/ID      Flowsheet Row Most Recent Value   Pathology Type Squamous cell carcinoma   Anatomic Site --  [left lateral foot]   Indications for MOHS tumor location   MOHS ID [id number]            MOHS Site/Accession/Pre-Post      Flowsheet Row Most Recent Value   Original Site Identified (as submitted by referring clinician) Photo   Biopsy Accession/Specimen # (as submitted by referring clincian) [id number]   Pre-MOHS Size Length (cm) 1.3   Pre-MOHS Size Width (cm) 0.8   Post-MOHS Size-Length (cm) 0.9   Post MOHS Size-Width (cm) 1.4   Repair Type Intermediate layered closure   Suture Type Monocryl plus   Monocryl Plus Suture Size 4   Final repair length (cm): 4.2   Anesthetic Used 1% Lidocaine with epinephrine  [buffered]          Cutaneous SCC Pathological staging     Staging form:      AJCC 8th edition T0 (No evidence of primary tumor)    [facility]   T0 (in situ SCC)       *features leading to selection of stage are highlighted above*      MOHS Tumor Stage 1 Information      Flowsheet Row Most Recent Value   Tissue Sections (blocks) 2   Microscopic Exam Section 1: No tumor identified in section.   Microscopic Exam Section 2: No tumor identified in section.   Tumor Clear After Stage I? Yes                        Patient identified, procedure verified, site identified and verified. Time out completed. Surgical removal of the lesion discussed with the patient (risks and benefits, including possibility of scarring, infection, recurrence or potential for further treatment)  I have specifically identified the site with the patient. I have discussed the fact that the patient will have a scar after the procedure regardless of granulation or repair with sutures. I have discussed that the repair options can range from granulation in some cases to linear or  curvilinear closures to larger flaps or grafts.  There are sometimes flaps or grafts used that require multiples stages of surgery and will not be completed today, rather be completed over a series of appointments. I have discussed that occasionally due to location, size or depth of the lesion I may recommend consultation with and transfer of care for further removal or the reconstruction to another provider such as ophthalmology surgery, plastic surgery, ENT surgery, or surgical oncology. There are cases in which other testing such as imaging with MRI or CT scan or testing of lymph nodes is recommended because of the nature/depth/location of tumor seen during the removal. There is a risk of injury to nerves causing temporary or permanent numbness or the inability to move muscles full such as the inability to lift eyebrows. Questions answered and verbal and written consent was obtained.    The tumor qualifies for Mohs based on AUC criteria. Dr. Walker served as the surgeon and pathologist during the procedure.    With the patient in the supine position and under adequate local anesthesia with 1% lidocaine with epinephrine 1:100,000, the defect was scrubbed with Chlorhexidine. Sterile drapes were placed from the sterile tray.  Because of the location of the surgical defect, an intermediate closure was judged to give the best possible cosmetic and functional result.  The edges of the defect were carefully debrided removing any dead or coagulated tissue.      Hemostasis was obtained by pinpoint electrocoagulation.  Careful planning of removal of redundant tissue at either end of the defect was drawn out so that the suture lines would fall in the optimal orientation with regard to the relaxed skin tension lines.  These were then removed with a #15 blade scalpel.  The wound was then approximated by a deep layer of buried vertical mattress sutures and the cutaneous margins were approximated and closed by superficial  sutures as noted above.  Estimated blood loss was less than 5 mL.      The patient tolerated the procedure well.  The wound was dressed with petrolatum, a non-stick pad, and a compression dressing.     Gloria Walker MD served as the surgeon and pathologist during the procedure.    Postoperative care: Wound care discussed at length.  I urged the patient to call us if any problems or question should arise.     Complications: none  Post-op medications: none  Patient condition after procedure: stable  Discharge plans: Plan for follow up as planned with general dermatologist for skin checks or sooner if needed for healing surgical site.       Scribe Attestation      I,:  Cassie Watson RN am acting as a scribe while in the presence of the attending physician.:       I,:  Gloria Walker MD personally performed the services described in this documentation    as scribed in my presence.:

## 2024-08-01 NOTE — PATIENT INSTRUCTIONS
"Mohs Microscopic Surgery After Care    WOUND CARE AFTER SURGERY:    Do NOT to remove the pressure bandage for 48 hours. Keep the area clean and dry while this bandage is on.    After removing the bandage for the first time, gently clean the area with soap and water. If the bandage is difficult to remove, getting the bandage wet in the shower will sometimes help soften the adhesive and allow it to be removed more easily.     You will now need to cleanse this area daily in the shower with gentle soap. There is no need to scrub the area. You will need to apply plain Vaseline ointment (this is over the counter and not a prescription) to the site for up to 2 weeks followed by a clean appropriately sized bandage to area.  Non stick dressing and paper tape (or Hypafix) are recommended for sensitive skin but a bandaid is fine if it covers the area well.    All your stitches will dissolve over the next two weeks. You will need to keep these moist with Vaseline and covered with a bandage over the next 2 weeks for them to dissolve appropriately.    RESTRICTIONS:     For two DAYS:   - You will need to take it very easy as this time is highest risk for bleeding. Being a \"couch potato\" during these two days is generally recommended.   - For surgeries on the face/neck/scalp: Avoid leaning down to pick things up off the floor as this brings blood up to your head. Instead, squat down to pick things up.     For two WEEKS:   - No heavy lifting (anything greater than 10 pounds)   - You can start to do slow, gentle activities such as slow walking but nothing to increase your heart rate and blood pressure too much (such as cardiovascular exercise). It is important to take it easy as there is still a risk for bleeding and a high risk popping of stitches open during this time.     If we did surgery near the eyes (including the nose, forehead, front part of your scalp, cheeks): It is VERY common to get a large amount of swelling around your " eyes (puffy eyes). Although less frequent, this can be enough to swell your eyes shut and can also come along with bruising. This should not hurt and is very expected and normal. It is typically worst at ~ 3 days out from your surgery and dramatically better 1 week post-operatively.   MANAGING YOUR PAIN AFTER SURGERY     You can expect to have some pain after surgery. This is normal. The pain is typically worse the first two days after surgery, and quickly begins to get better.     The best strategy for controlling your pain after surgery is around the clock pain control. You can take over the counter Acetaminophen (Tylenol) for discomfort, if no contraindications.     If you are taking this at the maximum dose, you can alternate this with Motrin (ibuprofen or Advil) as well. Alternating these medications with each other allows you to maximize your pain control. In addition to Tylenol and Motrin, you can use heating pads or ice packs on your incisions to help reduce your pain.     How will I alternate your regular strength over-the-counter pain medication?  You will take a dose of pain medication every three hours.   Start by taking 650 mg of Tylenol (2 pills of 325 mg)   3 hours later take 600 mg of Motrin (3 pills of 200 mg)   3 hours after taking the Motrin take 650 mg of Tylenol   3 hours after that take 600 mg of Motrin.    See example - if your first dose of Tylenol is at 12:00 PM     12:00 PM  Tylenol 650 mg (2 pills of 325 mg)    3:00 PM  Motrin 600 mg (3 pills of 200 mg)    6:00 PM  Tylenol 650 mg (2 pills of 325 mg)    9:00 PM  Motrin 600 mg (3 pills of 200 mg)    Continue alternating every 3 hours      Important:   Do not take more than 4000mg of Tylenol or 3200mg of Motrin in a 24-hour period.     What if I still have pain?   If you have pain that is not controlled with the over-the-counter pain medications (Tylenol and Motrin or Advil), don't hesitate to call our staff using the number provided. We will  help make sure you are managing your pain in the best way possible, and if necessary, we can provide a prescription for additional pain medication.       CALL OUR OFFICE IMMEDIATELY FOR ANY SIGNS OF INFECTION:    This includes fever, chills, increased redness, warmth, tenderness, severe discomfort/pain, or pus or foul smell coming from the wound. If you are experiencing any of the above, please call [facility]'s Mohs Department directly at [phone].    IF BLEEDING IS NOTICED:    Place a clean cloth over the area and apply firm pressure for thirty minutes.  Check the wound ONLY after 30 minutes of direct pressure; do not cheat and sneak a peak, as that does not count.  If bleeding persists after 30 minutes of legitimate direct pressure, then try one more round of direct pressure to the area.  Should the bleeding become heavier or not stop after the second attempt, call [facility] directly at [phone]. Your call will get routed to the dermatology surgeon on call even after hours.

## 2024-08-09 ENCOUNTER — OFFICE VISIT (OUTPATIENT)
Dept: DERMATOLOGY | Facility: CLINIC | Age: 64
End: 2024-08-09

## 2024-08-09 ENCOUNTER — TELEPHONE (OUTPATIENT)
Dept: DERMATOLOGY | Facility: CLINIC | Age: 64
End: 2024-08-09

## 2024-08-09 DIAGNOSIS — Z48.89 ENCOUNTER FOR POST SURGICAL WOUND CHECK: Primary | ICD-10-CM

## 2024-08-09 PROCEDURE — 99024 POSTOP FOLLOW-UP VISIT: CPT | Performed by: DERMATOLOGY

## 2024-08-09 NOTE — TELEPHONE ENCOUNTER
LM, attempting to give patient a post-op appointment either with Dr Wood today or with Dr Walker on Tuesday.   Mohs phone number given.

## 2024-08-09 NOTE — PROGRESS NOTES
WOUND CHECK    Physical Exam:  Anatomic Location Affected:  left lateral foot  Description of wound: pink and healing with slight dehiscence.   Closure Type: intermediate      Additional History of Present Condition: Post op 8 days. Present for wound check with slight dehiscence. Over all wound is healing well.  Steri strips were placed over the would to promote healing.     Assessment and Plan:  Based on a thorough discussion of this condition and the management approach to it (including a comprehensive discussion of the known risks, side effects and potential benefits of treatment), the patient (family) agrees to implement the following specific plan:  Continue to use Vaseline and a band aid once steri strips fall off  Monitor to for changes or signs of infection  Call office for any questions or concerns              Healing wound with one open area treated with steri strips.  Wound care discussed and f/u wound check prn.    Scribe Attestation      I,:  Anai Jean Baptiste MA am acting as a scribe while in the presence of the attending physician.:       I,:  Aravind Wood MD personally performed the services described in this documentation    as scribed in my presence.:

## 2024-09-13 ENCOUNTER — OFFICE VISIT (OUTPATIENT)
Dept: DERMATOLOGY | Facility: CLINIC | Age: 64
End: 2024-09-13
Payer: COMMERCIAL

## 2024-09-13 VITALS — HEIGHT: 62 IN | TEMPERATURE: 97.6 F | WEIGHT: 131.2 LBS | BODY MASS INDEX: 24.14 KG/M2

## 2024-09-13 DIAGNOSIS — Z85.828 HISTORY OF BASAL CELL CARCINOMA (BCC): ICD-10-CM

## 2024-09-13 DIAGNOSIS — D22.72 MULTIPLE BENIGN MELANOCYTIC NEVI OF BOTH UPPER EXTREMITIES, BOTH LOWER EXTREMITIES, AND TRUNK: ICD-10-CM

## 2024-09-13 DIAGNOSIS — D22.62 MULTIPLE BENIGN MELANOCYTIC NEVI OF BOTH UPPER EXTREMITIES, BOTH LOWER EXTREMITIES, AND TRUNK: ICD-10-CM

## 2024-09-13 DIAGNOSIS — D22.5 MULTIPLE BENIGN MELANOCYTIC NEVI OF BOTH UPPER EXTREMITIES, BOTH LOWER EXTREMITIES, AND TRUNK: ICD-10-CM

## 2024-09-13 DIAGNOSIS — D22.71 MULTIPLE BENIGN MELANOCYTIC NEVI OF BOTH UPPER EXTREMITIES, BOTH LOWER EXTREMITIES, AND TRUNK: ICD-10-CM

## 2024-09-13 DIAGNOSIS — L82.1 SEBORRHEIC KERATOSIS: ICD-10-CM

## 2024-09-13 DIAGNOSIS — D18.01 CHERRY ANGIOMA: ICD-10-CM

## 2024-09-13 DIAGNOSIS — L57.0 ACTINIC KERATOSES: ICD-10-CM

## 2024-09-13 DIAGNOSIS — L81.4 LENTIGINES: ICD-10-CM

## 2024-09-13 DIAGNOSIS — Z85.89 HISTORY OF SQUAMOUS CELL CARCINOMA: Primary | ICD-10-CM

## 2024-09-13 DIAGNOSIS — D22.61 MULTIPLE BENIGN MELANOCYTIC NEVI OF BOTH UPPER EXTREMITIES, BOTH LOWER EXTREMITIES, AND TRUNK: ICD-10-CM

## 2024-09-13 PROCEDURE — 17000 DESTRUCT PREMALG LESION: CPT

## 2024-09-13 PROCEDURE — 17003 DESTRUCT PREMALG LES 2-14: CPT

## 2024-09-13 PROCEDURE — 99214 OFFICE O/P EST MOD 30 MIN: CPT

## 2024-09-13 RX ORDER — PREDNISOLONE ACETATE 10 MG/ML
1 SUSPENSION/ DROPS OPHTHALMIC DAILY
COMMUNITY
Start: 2024-09-11

## 2024-09-13 NOTE — PATIENT INSTRUCTIONS
What is skin cancer?  Skin cancer is unfortunately very common. That's why we are here to help you on your journey to healthy happy skin! There are two main types of skin cancer: melanoma and non-melanoma skin cancer. Melanoma is a form of skin cancer that often arises within an existing nevus or mole. However, this is not always the case. Melanoma can arise anywhere (not only where you have moles right now). Melanoma can run in families, so letting us know about your family history is important. Non-melanoma skin cancer is the most common type of cancer in the [place]. The two main types of non-melanoma skin cancers are basal cell carcinomas (BCC) and squamous cell carcinoma (SCC). These cancers tend to be less aggressive than melanomas but are still important to look for and treat.    What can I do to prevent skin cancer?  One of the largest risk factors for skin cancer is sun exposure or UV radiation. Therefore, sun protection is metcalf! Here are some great tips for protecting yourself!  Try to avoid direct sun exposure during peak sun hours (10 AM to 2 PM)  Remember you get A LOT of sun even under cloud coverage and through care windows!  When choosing a sunscreen, look for one that says “broad spectrum” sunscreen. This means it protects you from more of the harmful UV rays.   Choose a sunscreen that is SPF 30 or greater for best protection.   Apply sunscreen to all sun-exposed skin and reapply every 2 hours.   Consider sun protective clothing! Great additions to your sun protective clothing wardrobe include broad brimmed hats, sunglasses, UPF clothing.  Avoid tanning salons. These have been shown to be very harmful in terms of your risk of skin cancer.   Avoid “base tans”. We now know that tans are dangerous (not just sun burns). If you want to have a tan for a trip, consider a spray tan!    Should I check my skin at home between my dermatology appointments?  Yes! It's always a great idea to look at your  skin on a regular basis. Here are some things to look for when monitoring your skin.   For melanoma, look for the ABCDE's!  A = Asymmetry. Look for a spot where one half does not match the other!  B = Borders. Look for a spot that has jagged, ragged or irregular borders.  C = Color. Look for a spot that is not evenly colored and often includes multiple colors, especially true black, red, white, blue, grey.   D = Diameter. Look for a spot that is larger than the size of a pencil eraser.  E = Evolution. If you ever have a spot that is changing in shape, color, size or symptoms (becomes itchy, painful or starts to bleed), always call us!  For non-melanoma skin cancers, look for a new, pink spot that is not going away, especially one that is itchy, painful or bleeding.     What should I do if I see a spot that is concerning for melanoma or non-melanoma skin cancer?  If you are ever concerned, call us! Do not wait for your next appointment. We want to help!

## 2024-09-13 NOTE — PROGRESS NOTES
"[facility] Note     Patient Name: Michelle Blanchard  Encounter Date: 9/13/24     Have you been cared for by a [facility] Dermatologist in the last 3 years and, if so, which description applies to you?    Yes.  I have been here within the last 3 years, and my medical history has NOT changed since that time.  I am FEMALE/of child-bearing potential.    REVIEW OF SYSTEMS:  Have you recently had or currently have any of the following? No changes in my recent health.   PAST MEDICAL HISTORY:  Have you personally ever had or currently have any of the following?  If \"YES,\" then please provide more detail. No changes in my medical history.   HISTORY OF IMMUNOSUPPRESSION: Do you have a history of any of the following:  Systemic Immunosuppression such as Diabetes, Biologic or Immunotherapy, Chemotherapy, Organ Transplantation, Bone Marrow Transplantation or Prednisone?  No     Answering \"YES\" requires the addition of the dotphrase \"IMMUNOSUPPRESSED\" as the first diagnosis of the patient's visit.   FAMILY HISTORY:  Any \"first degree relatives\" (parent, brother, sister, or child) with the following?    No changes in my family's known health.   PATIENT EXPERIENCE:    Do you want the Dermatologist to perform a COMPLETE skin exam today including a clinical examination under the \"bra and underwear\" areas?  Yes  If necessary, do we have your permission to call and leave a detailed message on your Preferred Phone number that includes your specific medical information?  Yes      No Known Allergies   Current Outpatient Medications:     fexofenadine (ALLEGRA) 180 MG tablet, Take 180 mg by mouth daily as needed, Disp: , Rfl:     fluticasone (FLONASE) 50 mcg/act nasal spray, 1 spray into each nostril daily, Disp: , Rfl:     levothyroxine 88 mcg tablet, Take 100 mcg by mouth daily, Disp: , Rfl:     meloxicam (MOBIC) 15 mg tablet, Take 7.5 mg by mouth daily, Disp: , Rfl:     Multiple Vitamins-Minerals (CENTRUM SILVER " 50+WOMEN PO), Take by mouth in the morning, Disp: , Rfl:     prednisoLONE acetate (PRED FORTE) 1 % ophthalmic suspension, Apply 1 drop to eye daily, Disp: , Rfl:     Cyanocobalamin (VITAMIN B 12 PO), Take 500 mg by mouth daily, Disp: , Rfl:     estradiol-norethindrone (ACTIVELLA) 1-0.5 MG per tablet, Take 1 tablet by mouth daily, Disp: , Rfl:     fluorouracil (EFUDEX) 5 % cream, Apply topically 2 (two) times a day, Disp: 40 g, Rfl: 0    Magnesium Gluconate 550 MG TABS, Take 1,000 mg by mouth daily, Disp: , Rfl:     methocarbamol (ROBAXIN) 500 mg tablet, Take 1 tablet (500 mg total) by mouth daily at bedtime as needed for muscle spasms, Disp: 10 tablet, Rfl: 0    metroNIDAZOLE (METROCREAM) 0.75 % cream, Apply topically twice daily, Disp: 45 g, Rfl: 6    olopatadine (PATANOL) 0.1 % ophthalmic solution, Apply 1 drop to eye if needed, Disp: , Rfl:     pantoprazole (PROTONIX) 40 mg tablet, Take 40 mg by mouth 2 (two) times a day, Disp: , Rfl:     Sulfacetamide Sodium-Sulfur 9.8-4.8 % LIQD, Wash daily, Disp: 1 Bottle, Rfl: 6          Whom besides the patient is providing clinical information about today's encounter?   NO ADDITIONAL HISTORIAN (patient alone provided history)    Physical Exam and Assessment/Plan by Diagnosis:    HISTORY OF SQUAMOUS CELL CARCINOMA     Physical Exam:  Anatomic Location Affected:  left lateral foot, left upper shin  Morphological Description of Scar:  well healed scar on shin; well healing on left lateral foot  Suspected Recurrence: no  Regional adenopathy: no    Additional History of Present Condition:  Mohs surgery done by Dr. Walker 8/1/2024 on th left lateral foot / Patient had the SCC o the left shin removed on 11/01/2022 by .     How can SCC be prevented?  The most important way to prevent SCC is to avoid sunburn. This is especially important in childhood and early life. Fair skinned individuals and those with a personal or family history of BCC should protect their skin from sun  exposure daily, year-round and lifelong.  Stay indoors or under the shade in the middle of the day   Wear covering clothing   Apply high protection factor SPF50+ broad-spectrum sunscreens generously to exposed skin if outdoors   Avoid indoor tanning (sun beds, solaria)      What is the outlook for SCC?  Most SCC are cured by treatment. Cure is most likely if treatment is undertaken when the lesion is small. A small percent of SCC's can spread to lymph  nodes and long term monitoring is indicated.   They are also at increased risk of other skin cancers, especially melanoma. Regular self-skin examinations and long-term annual skin checks by an experienced health professional are recommended.     HISTORY OF BASAL CELL CARCINOMA     Physical Exam:  Anatomic Location Affected:  Right bicep and mid upper back   Morphological Description of scar:  Well healed scar   Suspected Recurrence: No  Pertinent Positives:  Pertinent Negatives:        Additional History of Present Condition:  History of basal cell carcinoma with no sign of recurrence     Assessment and Plan:  Based on a thorough discussion of this condition and the management approach to it (including a comprehensive discussion of the known risks, side effects and potential benefits of treatment), the patient (family) agrees to implement the following specific plan:  When outside we recommend using a wide brim hat, sunglasses, long sleeve and pants, sunscreen with SPF 30+ with reapplication every 2 hours, or SPF specific clothing   Monitor for any reoccurrence      How can basal cell carcinoma be prevented?  The most important way to prevent BCC is to avoid sunburn. This is especially important in childhood and early life. Fair skinned individuals and those with a personal or family history of BCC should protect their skin from sun exposure daily, year-round and lifelong.  Stay indoors or under the shade in the middle of the day   Wear covering clothing   Apply high  protection factor SPF50+ broad-spectrum sunscreens generously to exposed skin if outdoors   Avoid indoor tanning (sun beds, solaria)  Oral nicotinamide (vitamin B3) in a dose of 500 mg twice daily may reduce the number and severity of BCCs.     What is the outlook for basal cell carcinoma?  Most BCCs are cured by treatment. Cure is most likely if treatment is undertaken when the lesion is small.  About 50% of people with BCC develop a second one within 3 years of the first. They are also at increased risk of other skin cancers, especially melanoma. Regular self-skin examinations and long-term annual skin checks by an experienced health professional are recommended.     SEBORRHEIC KERATOSES  - Relevant exam: Scattered over the trunk/extremities are waxy brown to black plaques and papules with stuck on appearance  - Exam and clinical history consistent with seborrheic keratoses  - Counseled that these are benign growths that do not require treatment  - Counseled that removal of lesions is considered cosmetic and so would incur a fee should patient elect to move forward.   - Patient to hold on treatments for now but will inform us should they desire additional treatments    MELANOCYTIC NEVI  -Relevant exam: Scattered over the trunk/extremities are homogenously pigmented brown macules and papules. ELM performed and without concerning findings.  - Exam and clinical history consistent with melanocytic nevi  - Educated on the ABCDE's of melanoma; handout provided  - Counseled to return to clinic prior to scheduled appointment should any of these lesions change or should any new lesions of concern arise  - Counseled on use of sun protection daily. Reviewed latest FDA sunscreen guidelines, including use of broad spectrum (UVA and UVB blocking) sunscreen or sun protective clothing with SPF 30-50 every 2-3 hours and reapplied after exposure to water; use of photoprotective clothing, including a broad brim hat and UPF rated  clothing if outdoors for several hours; avoid use of tanning beds as these pose significant risk for melanoma and skin cancer.    LENTIGINES  OTHER SKIN CHANGES DUE TO CHRONIC EXPOSURE TO NONIONIZING RADIATION  - Relevant exam: Over sun exposed areas are brown macules. ELM performed and without concerning findings.  - Exam and clinical history consistent with lentigines.  - Educated that these are indicative of prior sun exposure.   - Counseled to return to clinic prior to scheduled appointment should any of these lesions change or should any new lesions of concern arise.  - Recommended use of sunscreen as above and below.  - Counseled on use of sun protection daily. Reviewed latest FDA sunscreen guidelines, including use of broad spectrum (UVA and UVB blocking) sunscreen or sun protective clothing with SPF 30-50 every 2-3 hours and reapplied after exposure to water; use of photoprotective clothing, including a broad brim hat and UPF rated clothing if outdoors for several hours; avoid use of tanning beds as these pose significant risk for melanoma and skin cancer.    CHERRY ANGIOMAS  - Relevant exam: Scattered over the trunk/extremities are red papules  - Exam and clinical history consistent with cherry angiomas  - Educated that these are benign  - Educated that removal is considered aesthetic and would incur a fee.  - Patient does not wish to pursue removal at this time but will contact us should this change.    ACTINIC KERATOSES  - Relevant exam: On the nose, left dorsal hand are gritty pink papules  - Exam and clinical history consistent with actinic keratoses  - Discussed that these lesions are considered premalignant with the potential to evolve into squamous cell carcinoma.   - Discussed that these are due to chronic sun exposure and therefore recommend use of sunscreen/sun protection to prevent further sun damage  - Discussed treatment options, including liquid nitrogen destruction, topical immunotherapy, and  photodynamic therapy, including risks, benefits    PROCEDURE:  DESTRUCTION OF PRE-MALIGNANT LESIONS    - After a thorough discussion of treatment options and risk/benefits/alternatives (including but not limited to local pain, scarring, dyspigmentation, blistering, and possible superinfection), verbal and written consent were obtained and the aforementioned lesions were treated on with cryotherapy using liquid nitrogen x 1 cycle for 5-10 seconds.    TOTAL NUMBER of 3 pre-malignant lesions were treated today on the ANATOMIC LOCATION: Nose, left dorsal hand.     The patient tolerated the procedure well, and after-care instructions were provided.     Scribe Attestation      I,:  Toya Galvin MA am acting as a scribe while in the presence of the attending physician.:       I,:  Rosanne Pugh PA-C personally performed the services described in this documentation    as scribed in my presence.:

## 2024-12-02 ENCOUNTER — CONSULT EP (OUTPATIENT)
Dept: URBAN - METROPOLITAN AREA CLINIC 6 | Facility: CLINIC | Age: 64
End: 2024-12-02

## 2024-12-02 DIAGNOSIS — H11.442: ICD-10-CM

## 2024-12-02 PROCEDURE — 99213 OFFICE O/P EST LOW 20 MIN: CPT

## 2024-12-02 ASSESSMENT — VISUAL ACUITY
OD_SC: 20/20+1
OS_SC: 20/30+1

## 2025-03-05 NOTE — PROGRESS NOTES
ASSESSMENT & PLAN:   Diagnoses and all orders for this visit:    Well woman exam with routine gynecological exam  -     Mammo screening bilateral w 3d and cad; Future  -     Liquid-based pap, screening    Encounter for screening mammogram for malignant neoplasm of breast  -     Mammo screening bilateral w 3d and cad; Future    Encounter for screening for malignant neoplasm of cervix  -     Liquid-based pap, screening    Screening for HPV (human papillomavirus)  -     Liquid-based pap, screening    Other orders  -     meloxicam (MOBIC) 7.5 mg tablet; 7.5 mg  -     levothyroxine 112 mcg tablet          The following were reviewed in today's visit: ASCCP guidelines (Pap screen not indicated after age 65), STD testing, mammography screening ordered.    Patient to return to office in yearly for annual exam.     All questions have been answered to her satisfaction.        CC:  Annual Gynecologic Examination  Chief Complaint   Patient presents with    Gynecologic Exam     Pt is here for her yearly exam. Mammo ordered.  Last pap 2023 neg/HPV neg  Last mammo 2024 neg  Dexa 2024           HPI: Michelle Blanchard is a 64 y.o.  who presents for annual gynecologic examination.  She has the following concerns:  none      Health Maintenance:    Exercise: frequently  Breast exams/breast awareness: yes  Last mammogram: , normal  Colorectal cancer screenin, repeat in 5 years  DEXA: , normal    Past Medical History:   Diagnosis Date    Basal cell carcinoma Left neck        Basal cell carcinoma Right lower leg        Basal cell carcinoma Right neck        Basal cell carcinoma Right abdomen        Basal cell carcinoma (BCC) left upper and lower shoulder        Basal cell carcinoma (BCC) Right upper and lower leg        Basal cell carcinoma (BCC) Right upper arm        Basal cell carcinoma (BCC) Right chest, right clavicle        Basal cell carcinoma (BCC) Left leg,  left shoulder    1998    Basal cell carcinoma (BCC) Right chest    1998    Basal cell carcinoma (BCC) Left temple    2001    Basal cell carcinoma (BCC) Left Shoulder    2005    Basal cell carcinoma (BCC) Right chest     2008    Basal cell carcinoma (BCC) Left tip of nose    2009    Basal cell carcinoma (BCC) Left medial calf    2011    Basal cell carcinoma (BCC) Left lateral calf, left shin    2012    Basal cell carcinoma (BCC) Right anterior thigh    2012    Basal cell carcinoma (BCC) Right shin    2013    Basal cell carcinoma (BCC) Right mid back x 2    2012    Basal cell carcinoma (BCC) Mid back    2017    Basal cell carcinoma (BCC) Right mid shin inferior and superior    2014    Basal cell carcinoma (BCC) Left mandible    2016    Basal cell carcinoma (BCC) Right calf    2016    Basal cell carcinoma (BCC) Right inner forearm    2012    Basal cell carcinoma (BCC) Left upper medial calf    2016    Basal cell carcinoma (BCC) Right lateral knee    2015    BCC (basal cell carcinoma of skin) 09/27/2022    Right bicep, left upper shin    Female infertility     Hypothyroidism     Miscarriage     Squamous cell skin cancer        Past Surgical History:   Procedure Laterality Date    BREAST BIOPSY      BREAST LUMPECTOMY      MOHS SURGERY Left 08/01/2024    SCCIS left lateral foot- Dr. Walker    SKIN BIOPSY         Past OB/Gyn History:   No LMP recorded. Patient is postmenopausal.    Patient is menopausal.   Menopausal symptoms: occasional hot flash  Last Pap: 2023 : no abnormalities; further pap screening not indicated  History of abnormal Pap smear: no    Patient is currently sexually active.     Family History  Family History   Problem Relation Age of Onset    Basal cell carcinoma Mother     Basal cell carcinoma Father     Hypertension Father     Stroke Father     Basal cell carcinoma Sister     Diabetes Paternal Grandfather        Family history of uterine or ovarian cancer: no  Family history of breast cancer: no  Family  history of colon cancer: no    Social History:  Social History     Socioeconomic History    Marital status: Single     Spouse name: Not on file    Number of children: Not on file    Years of education: Not on file    Highest education level: Not on file   Occupational History    Not on file   Tobacco Use    Smoking status: Never    Smokeless tobacco: Never   Vaping Use    Vaping status: Never Used   Substance and Sexual Activity    Alcohol use: Yes     Comment: social    Drug use: Never    Sexual activity: Yes     Partners: Male   Other Topics Concern    Not on file   Social History Narrative    Not on file     Social Drivers of Health     Financial Resource Strain: Not At Risk (3/4/2025)    Received from [facility]    Financial Insecurity     In the last 12 months did you skip medications to save money?: No     In the last 12 months was there a time when you needed to see a doctor but could not because of cost?: No   Food Insecurity: No Food Insecurity (3/4/2025)    Received from [facility]    Food Insecurity     In the last 12 months did you ever eat less than you felt you should because there wasn't enough money for food?: No   Transportation Needs: No Transportation Needs (3/4/2025)    Received from [facility]    Transportation Needs     In the last 12 months have you ever had to go without healthcare because you didn't have a way to get there?: No   Physical Activity: Sufficiently Active (9/23/2023)    Received from [facility]    Exercise Vital Sign     Days of Exercise per Week: 7 days     Minutes of Exercise per Session: 120 min   Stress: No Stress Concern Present (9/6/2024)    Received from [facility]    [employer] - Occupational Stress Questionnaire     Feeling of Stress : Only a little   Social Connections: Socially Integrated (3/4/2025)    Received from [facility]     Social Connection     Do you often feel lonely?: No   Intimate Partner Violence: Not At Risk (9/6/2024)    [facility]    Humiliation, Afraid, Rape, and Kick questionnaire     Fear of Current or Ex-Partner: No     Emotionally Abused: No     Physically Abused: No     Sexually Abused: No   Housing Stability: Not At Risk (3/4/2025)    Received from [facility]    Housing Stability     Are you worried that in the next 2 months you may not have stable housing?: No     Domestic violence screen: negative    Allergies:  No Known Allergies    Medications:    Current Outpatient Medications:     fexofenadine (ALLEGRA) 180 MG tablet, Take 180 mg by mouth daily as needed, Disp: , Rfl:     fluticasone (FLONASE) 50 mcg/act nasal spray, 1 spray into each nostril daily, Disp: , Rfl:     levothyroxine 112 mcg tablet, , Disp: , Rfl:     meloxicam (MOBIC) 7.5 mg tablet, 7.5 mg, Disp: , Rfl:     Multiple Vitamins-Minerals (CENTRUM SILVER 50+WOMEN PO), Take by mouth in the morning, Disp: , Rfl:     olopatadine (PATANOL) 0.1 % ophthalmic solution, Apply 1 drop to eye if needed, Disp: , Rfl:     Cyanocobalamin (VITAMIN B 12 PO), Take 500 mg by mouth daily, Disp: , Rfl:     estradiol-norethindrone (ACTIVELLA) 1-0.5 MG per tablet, Take 1 tablet by mouth daily, Disp: , Rfl:     fluorouracil (EFUDEX) 5 % cream, Apply topically 2 (two) times a day, Disp: 40 g, Rfl: 0    levothyroxine 88 mcg tablet, Take 100 mcg by mouth daily, Disp: , Rfl:     Magnesium Gluconate 550 MG TABS, Take 1,000 mg by mouth daily, Disp: , Rfl:     meloxicam (MOBIC) 15 mg tablet, Take 7.5 mg by mouth daily, Disp: , Rfl:     methocarbamol (ROBAXIN) 500 mg tablet, Take 1 tablet (500 mg total) by mouth daily at bedtime as needed for muscle spasms, Disp: 10 tablet, Rfl: 0    metroNIDAZOLE (METROCREAM) 0.75 % cream, Apply topically twice daily, Disp: 45 g, Rfl: 6    pantoprazole (PROTONIX) 40 mg  "tablet, Take 40 mg by mouth 2 (two) times a day, Disp: , Rfl:     prednisoLONE acetate (PRED FORTE) 1 % ophthalmic suspension, Apply 1 drop to eye daily, Disp: , Rfl:     Sulfacetamide Sodium-Sulfur 9.8-4.8 % LIQD, Wash daily, Disp: 1 Bottle, Rfl: 6    Review of Systems:  Review of Systems      Physical Exam:  /76 (BP Location: Right arm, Patient Position: Sitting, Cuff Size: Standard)   Ht 5' 2\" (1.575 m)   Wt 59.9 kg (132 lb)   BMI 24.14 kg/m²    Physical Exam  Constitutional:       Appearance: Normal appearance.   Genitourinary:      Right Labia: No rash, lesions or skin changes.     Left Labia: No lesions, skin changes or rash.       Right Adnexa: not tender and no mass present.     Left Adnexa: not tender and no mass present.     No cervical motion tenderness, discharge or lesion.      Uterus is not enlarged or tender.   Breasts:     Right: No mass, nipple discharge, skin change or tenderness.      Left: No mass, nipple discharge, skin change or tenderness.   HENT:      Head: Normocephalic and atraumatic.   Eyes:      Extraocular Movements: Extraocular movements intact.   Cardiovascular:      Rate and Rhythm: Normal rate and regular rhythm.   Pulmonary:      Effort: Pulmonary effort is normal.      Breath sounds: Normal breath sounds.   Abdominal:      General: There is no distension.      Palpations: Abdomen is soft.      Tenderness: There is no abdominal tenderness. There is no guarding.   Musculoskeletal:         General: Normal range of motion.   Neurological:      Mental Status: She is alert. Mental status is at baseline.   Skin:     General: Skin is warm and dry.   Psychiatric:         Mood and Affect: Mood normal.         Behavior: Behavior normal.                              "

## 2025-03-06 ENCOUNTER — OFFICE VISIT (OUTPATIENT)
Dept: OBGYN CLINIC | Facility: CLINIC | Age: 65
End: 2025-03-06
Payer: MEDICARE

## 2025-03-06 VITALS
HEIGHT: 62 IN | DIASTOLIC BLOOD PRESSURE: 76 MMHG | BODY MASS INDEX: 24.29 KG/M2 | WEIGHT: 132 LBS | SYSTOLIC BLOOD PRESSURE: 122 MMHG

## 2025-03-06 DIAGNOSIS — Z12.4 ENCOUNTER FOR SCREENING FOR MALIGNANT NEOPLASM OF CERVIX: ICD-10-CM

## 2025-03-06 DIAGNOSIS — Z12.31 ENCOUNTER FOR SCREENING MAMMOGRAM FOR MALIGNANT NEOPLASM OF BREAST: ICD-10-CM

## 2025-03-06 DIAGNOSIS — Z11.51 SCREENING FOR HPV (HUMAN PAPILLOMAVIRUS): ICD-10-CM

## 2025-03-06 DIAGNOSIS — Z01.419 WELL WOMAN EXAM WITH ROUTINE GYNECOLOGICAL EXAM: Primary | ICD-10-CM

## 2025-03-06 PROCEDURE — G0101 CA SCREEN;PELVIC/BREAST EXAM: HCPCS | Performed by: OBSTETRICS & GYNECOLOGY

## 2025-03-06 PROCEDURE — G0145 SCR C/V CYTO,THINLAYER,RESCR: HCPCS | Performed by: OBSTETRICS & GYNECOLOGY

## 2025-03-06 PROCEDURE — G0476 HPV COMBO ASSAY CA SCREEN: HCPCS | Performed by: OBSTETRICS & GYNECOLOGY

## 2025-03-06 RX ORDER — MELOXICAM 7.5 MG/1
7.5 TABLET ORAL
COMMUNITY
Start: 2022-11-08

## 2025-03-06 RX ORDER — LEVOTHYROXINE SODIUM 112 UG/1
TABLET ORAL
COMMUNITY
Start: 2024-09-01

## 2025-03-07 LAB
HPV HR 12 DNA CVX QL NAA+PROBE: NEGATIVE
HPV16 DNA CVX QL NAA+PROBE: NEGATIVE
HPV18 DNA CVX QL NAA+PROBE: NEGATIVE

## 2025-03-10 ENCOUNTER — RESULTS FOLLOW-UP (OUTPATIENT)
Dept: OBGYN CLINIC | Facility: CLINIC | Age: 65
End: 2025-03-10

## 2025-03-12 ENCOUNTER — ESTABLISHED COMPREHENSIVE EXAM (OUTPATIENT)
Dept: URBAN - METROPOLITAN AREA CLINIC 6 | Facility: CLINIC | Age: 65
End: 2025-03-12

## 2025-03-12 DIAGNOSIS — H11.421: ICD-10-CM

## 2025-03-12 DIAGNOSIS — D31.32: ICD-10-CM

## 2025-03-12 DIAGNOSIS — H11.442: ICD-10-CM

## 2025-03-12 LAB
LAB AP GYN PRIMARY INTERPRETATION: NORMAL
Lab: NORMAL

## 2025-03-12 PROCEDURE — 92250 FUNDUS PHOTOGRAPHY W/I&R: CPT

## 2025-03-12 PROCEDURE — 92014 COMPRE OPH EXAM EST PT 1/>: CPT

## 2025-03-12 ASSESSMENT — VISUAL ACUITY
OU_CC: J1+
OU_SC: 20/20
OD_SC: 20/20-2
OS_SC: 20/25

## 2025-03-12 ASSESSMENT — TONOMETRY
OD_IOP_MMHG: 15
OS_IOP_MMHG: 12

## 2025-04-03 ENCOUNTER — OFFICE VISIT (OUTPATIENT)
Dept: DERMATOLOGY | Facility: CLINIC | Age: 65
End: 2025-04-03
Payer: MEDICARE

## 2025-04-03 DIAGNOSIS — L57.0 KERATOSIS, ACTINIC: ICD-10-CM

## 2025-04-03 DIAGNOSIS — D22.72 MULTIPLE BENIGN MELANOCYTIC NEVI OF BOTH UPPER EXTREMITIES, BOTH LOWER EXTREMITIES, AND TRUNK: ICD-10-CM

## 2025-04-03 DIAGNOSIS — Z85.89 HISTORY OF SQUAMOUS CELL CARCINOMA: Primary | ICD-10-CM

## 2025-04-03 DIAGNOSIS — L81.4 LENTIGINES: ICD-10-CM

## 2025-04-03 DIAGNOSIS — D22.61 MULTIPLE BENIGN MELANOCYTIC NEVI OF BOTH UPPER EXTREMITIES, BOTH LOWER EXTREMITIES, AND TRUNK: ICD-10-CM

## 2025-04-03 DIAGNOSIS — D18.01 CHERRY ANGIOMA: ICD-10-CM

## 2025-04-03 DIAGNOSIS — D22.62 MULTIPLE BENIGN MELANOCYTIC NEVI OF BOTH UPPER EXTREMITIES, BOTH LOWER EXTREMITIES, AND TRUNK: ICD-10-CM

## 2025-04-03 DIAGNOSIS — Z85.828 HISTORY OF BASAL CELL CARCINOMA (BCC): ICD-10-CM

## 2025-04-03 DIAGNOSIS — L82.1 SEBORRHEIC KERATOSIS: ICD-10-CM

## 2025-04-03 DIAGNOSIS — D22.5 MULTIPLE BENIGN MELANOCYTIC NEVI OF BOTH UPPER EXTREMITIES, BOTH LOWER EXTREMITIES, AND TRUNK: ICD-10-CM

## 2025-04-03 DIAGNOSIS — D22.71 MULTIPLE BENIGN MELANOCYTIC NEVI OF BOTH UPPER EXTREMITIES, BOTH LOWER EXTREMITIES, AND TRUNK: ICD-10-CM

## 2025-04-03 DIAGNOSIS — D48.5 NEOPLASM OF UNCERTAIN BEHAVIOR OF SKIN: ICD-10-CM

## 2025-04-03 PROCEDURE — 17000 DESTRUCT PREMALG LESION: CPT

## 2025-04-03 PROCEDURE — 88305 TISSUE EXAM BY PATHOLOGIST: CPT | Performed by: STUDENT IN AN ORGANIZED HEALTH CARE EDUCATION/TRAINING PROGRAM

## 2025-04-03 PROCEDURE — 88342 IMHCHEM/IMCYTCHM 1ST ANTB: CPT | Performed by: STUDENT IN AN ORGANIZED HEALTH CARE EDUCATION/TRAINING PROGRAM

## 2025-04-03 PROCEDURE — 99214 OFFICE O/P EST MOD 30 MIN: CPT

## 2025-04-03 PROCEDURE — 88341 IMHCHEM/IMCYTCHM EA ADD ANTB: CPT | Performed by: STUDENT IN AN ORGANIZED HEALTH CARE EDUCATION/TRAINING PROGRAM

## 2025-04-03 PROCEDURE — 11102 TANGNTL BX SKIN SINGLE LES: CPT

## 2025-04-03 NOTE — PROGRESS NOTES
"[facility] Dermatology Clinic Note     Patient Name: Michelle Blanchard  Encounter Date: 4/3/25     Have you been cared for by a [facility] Dermatologist in the last 3 years and, if so, which description applies to you?    Yes.  I have been here within the last 3 years, and my medical history has NOT changed since that time.  I am FEMALE/of child-bearing potential.    REVIEW OF SYSTEMS:  Have you recently had or currently have any of the following? No changes in my recent health.   PAST MEDICAL HISTORY:  Have you personally ever had or currently have any of the following?  If \"YES,\" then please provide more detail. No changes in my medical history.   HISTORY OF IMMUNOSUPPRESSION: Do you have a history of any of the following:  Systemic Immunosuppression such as Diabetes, Biologic or Immunotherapy, Chemotherapy, Organ Transplantation, Bone Marrow Transplantation or Prednisone?  No     Answering \"YES\" requires the addition of the dotphrase \"IMMUNOSUPPRESSED\" as the first diagnosis of the patient's visit.   FAMILY HISTORY:  Any \"first degree relatives\" (parent, brother, sister, or child) with the following?    No changes in my family's known health.   PATIENT EXPERIENCE:    Do you want the Dermatologist to perform a COMPLETE skin exam today including a clinical examination under the \"bra and underwear\" areas?  Yes  If necessary, do we have your permission to call and leave a detailed message on your Preferred Phone number that includes your specific medical information?  Yes      No Known Allergies   Current Outpatient Medications:     fexofenadine (ALLEGRA) 180 MG tablet, Take 180 mg by mouth daily as needed, Disp: , Rfl:     fluticasone (FLONASE) 50 mcg/act nasal spray, 1 spray into each nostril daily, Disp: , Rfl:     levothyroxine 112 mcg tablet, , Disp: , Rfl:     meloxicam (MOBIC) 7.5 mg tablet, 7.5 mg, Disp: , Rfl:     Multiple Vitamins-Minerals (CENTRUM SILVER 50+WOMEN PO), Take by mouth in the morning, Disp: , " Rfl:     Cyanocobalamin (VITAMIN B 12 PO), Take 500 mg by mouth daily, Disp: , Rfl:     estradiol-norethindrone (ACTIVELLA) 1-0.5 MG per tablet, Take 1 tablet by mouth daily, Disp: , Rfl:     fluorouracil (EFUDEX) 5 % cream, Apply topically 2 (two) times a day, Disp: 40 g, Rfl: 0    levothyroxine 88 mcg tablet, Take 100 mcg by mouth daily, Disp: , Rfl:     Magnesium Gluconate 550 MG TABS, Take 1,000 mg by mouth daily, Disp: , Rfl:     meloxicam (MOBIC) 15 mg tablet, Take 7.5 mg by mouth daily, Disp: , Rfl:     methocarbamol (ROBAXIN) 500 mg tablet, Take 1 tablet (500 mg total) by mouth daily at bedtime as needed for muscle spasms, Disp: 10 tablet, Rfl: 0    metroNIDAZOLE (METROCREAM) 0.75 % cream, Apply topically twice daily, Disp: 45 g, Rfl: 6    olopatadine (PATANOL) 0.1 % ophthalmic solution, Apply 1 drop to eye if needed, Disp: , Rfl:     pantoprazole (PROTONIX) 40 mg tablet, Take 40 mg by mouth 2 (two) times a day, Disp: , Rfl:     prednisoLONE acetate (PRED FORTE) 1 % ophthalmic suspension, Apply 1 drop to eye daily, Disp: , Rfl:     Sulfacetamide Sodium-Sulfur 9.8-4.8 % LIQD, Wash daily, Disp: 1 Bottle, Rfl: 6          Whom besides the patient is providing clinical information about today's encounter?   NO ADDITIONAL HISTORIAN (patient alone provided history)    Physical Exam and Assessment/Plan by Diagnosis:    HISTORY OF SQUAMOUS CELL CARCINOMA      Physical Exam:  Anatomic Location Affected:  left lateral foot, left upper shin  Morphological Description of Scar:  well healed scar on shin; well healed on left lateral foot  Suspected Recurrence: no  Regional adenopathy: no     Additional History of Present Condition:  Mohs surgery done by Dr. Walker 8/1/2024 on th left lateral foot. Patient had the SCC o the left shin removed on 11/01/2022 by .     Assessment and Plan:  Based on a thorough discussion of this condition and the management approach to it (including a comprehensive discussion of the known  "risks, side effects and potential benefits of treatment), the patient (family) agrees to implement the following specific plan:  When outside we recommend using a wide brim hat, sunglasses, long sleeve and pants, sunscreen with SPF 30+ with reapplication every 2 hours, or SPF specific clothing   Monitor for any reoccurrence   Skin exams every 6 months.     HISTORY OF BASAL CELL CARCINOMA     Physical Exam:  Anatomic Location Affected:  Right bicep and mid upper back, multiple spots   Morphological Description of scar:  Well healed scar   Suspected Recurrence: No        Additional History of Present Condition:  History of basal cell carcinoma with no sign of recurrence. Patient reports she had \"at least 30\".     Assessment and Plan:  Based on a thorough discussion of this condition and the management approach to it (including a comprehensive discussion of the known risks, side effects and potential benefits of treatment), the patient (family) agrees to implement the following specific plan:  When outside we recommend using a wide brim hat, sunglasses, long sleeve and pants, sunscreen with SPF 30+ with reapplication every 2 hours, or SPF specific clothing   Monitor for any reoccurrence   Skin exams every 6 months.        MELANOCYTIC NEVI (\"Moles\")    Physical Exam:  Anatomic Location Affected:   Mostly on sun-exposed areas of the trunk and extremities  Morphological Description:  Scattered, 1-4mm round to ovoid, symmetrical-appearing, even bordered, skin colored to dark brown macules/papules      Additional History of Present Condition:  Present on exam. Denies any pain, itch, bleeding. Present for years. Denies actively changing or growing moles.     Assessment and Plan:  Based on a thorough discussion of this condition and the management approach to it (including a comprehensive discussion of the known risks, side effects and potential benefits of treatment), the patient (family) agrees to implement the following " "specific plan:  Reassured benign.   Monitor for changes. ABCDE's of melanoma handout provided.   Practice sun protection. Apply broad spectrum (UVA and UVB) sunscreen, SPF 30 or higher every 2 hours. Wear sun protective clothing, hats, and sunglasses.      LENTIGO    Physical Exam:  Anatomic Location Affected:  sun exposed areas of face, trunk and extremities   Morphological Description:  multiple scattered tan to brown evenly pigmented macules      Additional History of Present Condition:  Present on exam.     Assessment and Plan:  Based on a thorough discussion of this condition and the management approach to it (including a comprehensive discussion of the known risks, side effects and potential benefits of treatment), the patient (family) agrees to implement the following specific plan:  Reassured benign.   Practice sun protection. Apply broad spectrum (UVA and UVB) sunscreen, SPF 30 or higher every 2 hours. Wear sun protective clothing, hats, and sunglasses.       SEBORRHEIC KERATOSIS; NON-INFLAMED    Physical Exam:  Anatomic Location Affected:  face, trunk and extremities  Morphological Description:  Flat and raised, waxy, smooth to warty textured, yellow to brownish-grey to dark brown to blackish, discrete, \"stuck-on\" appearing papules.      Additional History of Present Condition:  Present on exam. Patient denies any itching.     Assessment and Plan:  Based on a thorough discussion of this condition and the management approach to it (including a comprehensive discussion of the known risks, side effects and potential benefits of treatment), the patient (family) agrees to implement the following specific plan:  Reassured benign.     ANGIOMA (\"CHERRY ANGIOMA\")     Physical Exam:  Anatomic Location: scattered across trunk and extremities   Morphologic Description: 1-2 mm firm red to maroon to purples to blue discrete papule, on dermoscopy lacunae  by white septae seen       Additional History of Present " Condition:  Present on exam.     Plan:  Reassured benign.     subsequent risk for infection).      ACTINIC KERATOSES  - Relevant exam: On the left nasal ala is a scaly pink macule without palpable dermal component      Additional History of Present Condition: present on exam.Patient had this treated with cryotherapy once before. It resolved but recently returned.      Assessment and Plan:   - Exam and clinical history consistent with actinic keratoses  - Discussed that these lesions are considered premalignant with the potential to evolve into squamous cell carcinoma.   - Discussed that these are due to chronic sun exposure and therefore recommend use of sunscreen/sun protection to prevent further sun damage  - Discussed treatment options including risks, benefits  - Patient counseled to return to the office in 4-6 weeks for recheck if not resolved at which time  biopsy to rule out SCC will be determined based on clinic findings    PROCEDURE:  DESTRUCTION OF PRE-MALIGNANT LESIONS    - After a thorough discussion of treatment options and risk/benefits/alternatives (including but not limited to local pain, scarring, dyspigmentation, blistering, and possible superinfection), verbal and written consent were obtained and the aforementioned lesions were treated on with cryotherapy using liquid nitrogen x 1 cycle for 5-10 seconds.    TOTAL NUMBER of 1 pre-malignant lesions were treated today on the ANATOMIC LOCATION: Left nasal ala.  ADVISED PATIENT TO RETURN TO CLINIC FOR BIOPSY IF LESION DOES NOT RESOLVE         The patient tolerated the procedure well, and after-care instructions were provided.    NEOPLASM OF UNCERTAIN BEHAVIOR OF SKIN    Physical Exam:  (Anatomic Location); (Size and Morphological Description); (Differential Diagnosis):  Specimen A: Right upper arm; 0.6 cm x 0.4 cm atrophic pearly macule with telangiectasia; DDX: BCC           Additional History of Present Condition:  Present on exam.     Assessment  "and Plan:  I have discussed with the patient that a sample of skin via a \"skin biopsy” would be potentially helpful to further make a specific diagnosis under the microscope.  Based on a thorough discussion of this condition and the management approach to it (including a comprehensive discussion of the known risks, side effects and potential benefits of treatment), the patient (family) agrees to implement the following specific plan:    Procedure:  Skin Biopsy.  After a thorough discussion of treatment options and risk/benefits/alternatives (including but not limited to local pain, scarring, dyspigmentation, blistering, possible superinfection, and inability to confirm a diagnosis via histopathology), verbal and written consent were obtained and portion of the rash was biopsied for tissue sample.  See below for consent that was obtained from patient and subsequent Procedure Note.   Will call with results.     PROCEDURE TANGENTIAL (SHAVE) BIOPSY NOTE:    Performing Physician:  Marah Reyna PA-C  Anatomic Location; Clinical Description with size (cm); Pre-Op Diagnosis:   Specimen A: Right upper arm; 0.6 cm x 0.4 cm atrophic pearly macule with telangiectasia; DDX: BCC  Post-op diagnosis: Same     Local anesthesia: 1% xylocaine with epi      Topical anesthesia: None    Hemostasis: Aluminum chloride       After obtaining informed consent  at which time there was a discussion about the purpose of biopsy  and low risks of infection and bleeding.  The area was prepped and draped in the usual fashion. Anesthesia was obtained with 1% lidocaine with epinephrine. A shave biopsy to an appropriate sampling depth was obtained by Shave (Dermablade or 15 blade) The resulting wound was covered with surgical ointment and bandaged appropriately.     The patient tolerated the procedure well without complications and was without signs of functional compromise.      Specimen has been sent for review by Dermatopathology.    Standard " post-procedure care has been explained and has been included in written form within the patient's copy of Informed Consent.    Follow-up: 6 months for skin exam.   Scribe Attestation      I,:  Zeferino Funes am acting as a scribe while in the presence of the attending physician.:       I,:  Marah Reyna PA-C personally performed the services described in this documentation    as scribed in my presence.:

## 2025-04-08 ENCOUNTER — TELEPHONE (OUTPATIENT)
Dept: DERMATOLOGY | Facility: CLINIC | Age: 65
End: 2025-04-08

## 2025-04-08 ENCOUNTER — RESULTS FOLLOW-UP (OUTPATIENT)
Age: 65
End: 2025-04-08

## 2025-04-08 PROCEDURE — 88341 IMHCHEM/IMCYTCHM EA ADD ANTB: CPT | Performed by: STUDENT IN AN ORGANIZED HEALTH CARE EDUCATION/TRAINING PROGRAM

## 2025-04-08 PROCEDURE — 88305 TISSUE EXAM BY PATHOLOGIST: CPT | Performed by: STUDENT IN AN ORGANIZED HEALTH CARE EDUCATION/TRAINING PROGRAM

## 2025-04-08 PROCEDURE — 88342 IMHCHEM/IMCYTCHM 1ST ANTB: CPT | Performed by: STUDENT IN AN ORGANIZED HEALTH CARE EDUCATION/TRAINING PROGRAM

## 2025-04-08 NOTE — RESULT ENCOUNTER NOTE
DERMATOPATHOLOGY RESULT NOTE    Results reviewed which demonstrated a pigmented basal cell carcinoma (superficial type). This is malignant. Treatment options include topicals such as Efudex or Imiquimod, ED and C or excision. Called patient to discuss results and treatment options including risks and benefits of both. She states topicals do not work for her. She would like to have an ED and C procedure done. She would just like to wait until after her sons wedding in May as she is wearing a sleeveless dress. Advised I will let clerical staff know. Will send message to  clerical staff to schedule patient. She is already scheduled for a 6 month skin exam.       Instructions for Clinical Derm Team:   (remember to route Result Note to appropriate staff):    None    Result & Plan by Specimen:    Specimen A: malignant  Plan: electrodessication and curretage     [id number]  Order: [id number]   Status: Final result      Dx: Neoplasm of uncertain behavior of skin    Test Result Released: Yes (not seen)    View Follow-Up Encounter     Component  Ref Range & Units (hover)   Case Report  Surgical Pathology Report                         Case: [id number]                                  Authorizing Provider:  Marah Reyna PA-C     Collected:           04/03/2025 1342              Ordering Location:     [facility]      Received:            04/03/2025 1342                                     [place]                                                                Pathologist:           Sacha Awad MD                                                          Specimen:    Skin, Other, Specimen A: Right upper arm                                                Final Diagnosis  A. Skin, right upper arm, shave biopsy:    Pigmented BASAL CELL CARCINOMA (SUPERFICIAL TYPE).   Electronically signed by Sacha Awad MD on 4/8/2025 at 1219 EDT  Additional Information   All reported additional testing was performed  "with appropriately reactive controls.  These tests were developed and their performance characteristics determined by [facility] or appropriate performing facility, though some tests may be performed on tissues which have not been validated for performance characteristics (such as staining performed on alcohol exposed cell blocks and decalcified tissues).  Results should be interpreted with caution and in the context of the patients' clinical condition. These tests may not be cleared or approved by the U.S. Food and Drug Administration, though the FDA has determined that such clearance or approval is not necessary. These tests are used for clinical purposes and they should not be regarded as investigational or for research. This laboratory has been approved by [place] 88, designated as a high-complexity laboratory and is qualified to perform these tests.  .  Gross Description   A. The specimen is received in formalin, labeled with the patient's name and hospital number, and is designated \" right upper arm.\"  It consists of a 0.9 x 0.7 cm fragment of skin, less than 0.1 cm thick.  The skin is white to tan and smooth to wrinkled.  The margin is inked green and the skin surface is inked red.  The specimen is bisected and submitted entirely in 1 cassette, between sponges.    Note: The estimated total formalin fixation time based upon information provided by the submitting clinician and the standard processing schedule is over 72 hours.    MScheib  Clinical Information   ATTENTION:  DERMPATH GROUP    SPECIMEN A; Skin; Anatomic Location: right upper arm; Procedure/Protocol: Skin Specimen (submit in FORMALIN):Tangential Biopsy (includes shave, scoop, saucerization, curette) (CPT 56934; each additional tangential biopsy is CPT 02641)  64 y.o. year old  Female with a Morphological Description: 0.6 cm x 0.4 cm atrophic pearly macule with telangiectasia ddx: BCC  Resulting Agency [facility]          Specimen " Collected: 04/03/25  1:42 PM Last Resulted: 04/08/25 12:19 PM

## 2025-04-08 NOTE — TELEPHONE ENCOUNTER
----- Message -----   From: Marah Reyna PA-C   Sent: 4/8/2025  12:57 PM EDT   To: Dermatology [place] Clerical     Thuan almonte,   please schedule this patient for an ED and C of a pigmented superficial  basal cell carcinoma on the right upper arm. She would prefer the appointment to be after her sons wedding in May. Please let me know once she has been scheduled.

## 2025-04-08 NOTE — TELEPHONE ENCOUNTER
I called & spoke to pt to sched her procedure, pt states she was driving and will call the office back to sched it. When pt calls back sched her appropriately, I have nothing held

## 2025-05-08 ENCOUNTER — EVALUATION (OUTPATIENT)
Dept: PHYSICAL THERAPY | Facility: REHABILITATION | Age: 65
End: 2025-05-08
Payer: MEDICARE

## 2025-05-08 DIAGNOSIS — M54.2 CERVICALGIA: Primary | ICD-10-CM

## 2025-05-08 DIAGNOSIS — M47.812 CERVICAL SPONDYLOSIS WITHOUT MYELOPATHY: ICD-10-CM

## 2025-05-08 PROCEDURE — 97162 PT EVAL MOD COMPLEX 30 MIN: CPT | Performed by: PHYSICAL THERAPIST

## 2025-05-08 PROCEDURE — 97112 NEUROMUSCULAR REEDUCATION: CPT | Performed by: PHYSICAL THERAPIST

## 2025-05-08 NOTE — PROGRESS NOTES
PT Evaluation     Today's date: 2025  Patient name: Michelle Blanchard  : 1960  MRN: [id number]  Referring provider: Lizette Lamb DO  Dx:   Encounter Diagnosis     ICD-10-CM    1. Cervicalgia  M54.2       2. Cervical spondylosis without myelopathy  M47.812           Start Time: 0950  Stop Time: 1030  Total time in clinic (min): 40 minutes    Assessment  Impairments: abnormal muscle firing, abnormal muscle tone, abnormal or restricted ROM, activity intolerance, impaired physical strength, lacks appropriate home exercise program and pain with function  Symptom irritability: high    Assessment details: 65 y/o right hand dominant female presents with c/o right-sided neck pain that radiates into her right shoulder.   She rates the neck pain at 8/10 in the AM.  Currently, the pain is a 6/10 and she rates it at a 2/10 at best. Occasionally she experiences N/T in the median nerve distribution in the right wrist.   Recent cervical imaging = multilevel degenerative changes (most prominent at C4-5 and C5-6).   Right shoulder imaging = no internal derangement.   Functional limitations:  sleeping on her right side, bike riding > 17 miles, playing pickleball > 45 minutes, certain movements (heavy lifting and sorting) when volunteering  Understanding of Dx/Px/POC: good     Prognosis: good    Goals  ST - I with HEP  2 - neck pain < 5/10 at worst  LT - FOTO > 65  2 - bike ride > 10 miles with < 2/10 pain  3 - play pickelball > 30 minutes with < 2/10 pain  4 - sleep on her right side > 5 hours without waking due to neck pain    Plan  Patient would benefit from: skilled physical therapy    Frequency: 1x week  Duration in weeks: 12  Treatment plan discussed with: patient    Subjective Evaluation    Quality of life: good    Patient Goals  Patient goals for therapy: decreased pain, increased motion, increased strength, independence with ADLs/IADLs and return to sport/leisure activities    Pain  At best pain rating:  "2  At worst pain ratin  Quality: tight, discomfort, pulling and knife-like  Relieving factors: rest, support and change in position  Aggravating factors: lifting and overhead activity    Social Support  Lives in: multiple-level home  Lives with: spouse      Diagnostic Tests  X-ray: abnormal  Treatments  No previous or current treatments    Objective     Concurrent Complaints  Positive for disturbed sleep. Negative for trouble swallowing and history of trauma    Neurological Testing     Sensation   Cervical/Thoracic   Left   Intact: pin prick    Right   Intact: pin prick    Reflexes   Left   Biceps (C5/C6): normal (2+)  Brachioradialis (C6): normal (2+)  Triceps (C7): normal (2+)    Right   Biceps (C5/C6): normal (2+)  Brachioradialis (C6): normal (2+)  Triceps (C7): normal (2+)    Active Range of Motion   Cervical/Thoracic Spine       Cervical    Subcranial retraction:   Restriction level: maximal  Flexion:  with pain Restriction level: minimal  Extension:  with pain Restriction level: minimal  Left rotation:  with pain Restriction level: moderate  Right rotation:  with pain Restriction level: moderate    Additional Active Range of Motion Details  Flexion = \"pulling\"  Extension = 85%, \"hurts at base\"  Rotation:  left = 50%, right = 40%  Retraction:  < neutral    Strength/Myotome Testing     Left Shoulder     Planes of Motion   Flexion: 5   Abduction: 5   External rotation at 0°: 5     Right Shoulder     Planes of Motion   Flexion: 5   Abduction: 5   External rotation at 0°: 5     Left Elbow   Flexion: 5    Right Elbow   Flexion: 5    Left Wrist/Hand   Wrist extension: 5  Thumb extension: 5    Right Wrist/Hand   Wrist extension: 5  Thumb extension: 5    Additional Strength Details  Finger abduction:  left = 5, right = 4     (pos 2):  left = 35, right = 40    General Comments:      Cervical/Thoracic Comments  FOTO = 54           Precautions: not sig      Manuals             Tissue deformation - right UT " Supine - LMR                                                   Neuro Re-Ed 05/08                                                                                                       Ther Ex 05/08            Cx retraction 2x7            Low rows Grn - 2x15            HEP LMR            Pt education LMR                                                                Ther Activity                                       Gait Training                                       Modalities

## 2025-05-08 NOTE — LETTER
May 8, 2025    Fiona Parekh MD  [address] [place] [address]    Patient: Michelle Blanchard   YOB: 1960   Date of Visit: 2025     Encounter Diagnosis     ICD-10-CM    1. Cervicalgia  M54.2       2. Cervical spondylosis without myelopathy  M47.812           Dear Dr. Fiona Parekh MD:    Thank you for your recent referral of Michelle Blanchard. Please review the attached evaluation summary from Michelle's recent visit.     Please verify that you agree with the plan of care by signing the attached order.     If you have any questions or concerns, please do not hesitate to call.     I sincerely appreciate the opportunity to share in the care of one of your patients and hope to have another opportunity to work with you in the near future.       Sincerely,    Virginia Walker, PT      Referring Provider:      I certify that I have read the below Plan of Care and certify the need for these services furnished under this plan of treatment while under my care.                    Fiona Parekh MD  [address] [place] [address]  Via Fax: [phone]          PT Evaluation     Today's date: 2025  Patient name: Michelle Blanchard  : 1960  MRN: [id number]  Referring provider: Lizette Lamb DO  Dx:   Encounter Diagnosis     ICD-10-CM    1. Cervicalgia  M54.2       2. Cervical spondylosis without myelopathy  M47.812           Start Time: 0950  Stop Time: 1030  Total time in clinic (min): 40 minutes    Assessment  Impairments: abnormal muscle firing, abnormal muscle tone, abnormal or restricted ROM, activity intolerance, impaired physical strength, lacks appropriate home exercise program and pain with function  Symptom irritability: high    Assessment details: 63 y/o right hand dominant female presents with c/o right-sided neck pain that radiates into her right shoulder.   She rates the neck pain at 8/10 in the AM.  Currently, the  pain is a 6/10 and she rates it at a 2/10 at best. Occasionally she experiences N/T in the median nerve distribution in the right wrist.   Recent cervical imaging = multilevel degenerative changes (most prominent at C4-5 and C5-6).   Right shoulder imaging = no internal derangement.   Functional limitations:  sleeping on her right side, bike riding > 17 miles, playing pickleball > 45 minutes, certain movements (heavy lifting and sorting) when volunteering  Understanding of Dx/Px/POC: good     Prognosis: good    Goals  ST - I with HEP  2 - neck pain < 5/10 at worst  LT - FOTO > 65  2 - bike ride > 10 miles with < 2/10 pain  3 - play pickelball > 30 minutes with < 2/10 pain  4 - sleep on her right side > 5 hours without waking due to neck pain    Plan  Patient would benefit from: skilled physical therapy    Frequency: 1x week  Duration in weeks: 12  Treatment plan discussed with: patient    Subjective Evaluation    Quality of life: good    Patient Goals  Patient goals for therapy: decreased pain, increased motion, increased strength, independence with ADLs/IADLs and return to sport/leisure activities    Pain  At best pain ratin  At worst pain ratin  Quality: tight, discomfort, pulling and knife-like  Relieving factors: rest, support and change in position  Aggravating factors: lifting and overhead activity    Social Support  Lives in: multiple-level home  Lives with: spouse      Diagnostic Tests  X-ray: abnormal  Treatments  No previous or current treatments    Objective     Concurrent Complaints  Positive for disturbed sleep. Negative for trouble swallowing and history of trauma    Neurological Testing     Sensation   Cervical/Thoracic   Left   Intact: pin prick    Right   Intact: pin prick    Reflexes   Left   Biceps (C5/C6): normal (2+)  Brachioradialis (C6): normal (2+)  Triceps (C7): normal (2+)    Right   Biceps (C5/C6): normal (2+)  Brachioradialis (C6): normal (2+)  Triceps (C7): normal  "(2+)    Active Range of Motion   Cervical/Thoracic Spine       Cervical    Subcranial retraction:   Restriction level: maximal  Flexion:  with pain Restriction level: minimal  Extension:  with pain Restriction level: minimal  Left rotation:  with pain Restriction level: moderate  Right rotation:  with pain Restriction level: moderate    Additional Active Range of Motion Details  Flexion = \"pulling\"  Extension = 85%, \"hurts at base\"  Rotation:  left = 50%, right = 40%  Retraction:  < neutral    Strength/Myotome Testing     Left Shoulder     Planes of Motion   Flexion: 5   Abduction: 5   External rotation at 0°: 5     Right Shoulder     Planes of Motion   Flexion: 5   Abduction: 5   External rotation at 0°: 5     Left Elbow   Flexion: 5    Right Elbow   Flexion: 5    Left Wrist/Hand   Wrist extension: 5  Thumb extension: 5    Right Wrist/Hand   Wrist extension: 5  Thumb extension: 5    Additional Strength Details  Finger abduction:  left = 5, right = 4     (pos 2):  left = 35, right = 40    General Comments:      Cervical/Thoracic Comments  FOTO = 54           Precautions: not sig      Manuals 05/08            Tissue deformation - right UT Supine - LMR                                                   Neuro Re-Ed 05/08                                                                                                       Ther Ex 05/08            Cx retraction 2x7            Low rows Grn - 2x15            HEP LMR            Pt education LMR                                                                Ther Activity                                       Gait Training                                       Modalities                                                             "

## 2025-05-16 ENCOUNTER — OFFICE VISIT (OUTPATIENT)
Dept: PHYSICAL THERAPY | Facility: REHABILITATION | Age: 65
End: 2025-05-16
Payer: MEDICARE

## 2025-05-16 DIAGNOSIS — M47.812 CERVICAL SPONDYLOSIS WITHOUT MYELOPATHY: ICD-10-CM

## 2025-05-16 DIAGNOSIS — M54.2 CERVICALGIA: Primary | ICD-10-CM

## 2025-05-16 PROCEDURE — 97140 MANUAL THERAPY 1/> REGIONS: CPT | Performed by: PHYSICAL THERAPIST

## 2025-05-16 NOTE — PROGRESS NOTES
Daily Note     Today's date: 2025  Patient name: Michelle Blanchard  : 1960  MRN: [id number]  Referring provider: Lizette Lamb DO  Dx:   Encounter Diagnosis     ICD-10-CM    1. Cervicalgia  M54.2       2. Cervical spondylosis without myelopathy  M47.812           Start Time: 725  Stop Time: 805  Total time in clinic (min): 40 minutes    Subjective: Pt experienced relief of sx's for ~ 24 hours after the last PT session.  However, the tightness slowly started to creep back in. She still experiences pain with right cx rotation.       Objective: See treatment diary below      Assessment: Tolerated treatment well. Patient would benefit from continued PT      Plan: Continue per plan of care. F/u when pt returns from vacation.      Precautions: not sig      Manuals            Supine - sustained pressure - lev scap  LMR           Strumming - SCM  LMR           Tissue deformation - right UT Supine - LMR Seated - LMR           Right rot cx NAG's  nv           SOR  LMR           Sustained pressure - right UT  LMR                        Parallel stripping - cx paraspinals  LMR                        Neuro Re-Ed                                                                                                       Ther Ex            Cx retraction 2x7            Low rows Grn - 2x15            HEP LMR            Pt education LMR                                                                  Ther Activity                                       Gait Training                                       Modalities

## 2025-06-05 ENCOUNTER — PROCEDURE VISIT (OUTPATIENT)
Dept: DERMATOLOGY | Facility: CLINIC | Age: 65
End: 2025-06-05
Payer: MEDICARE

## 2025-06-05 VITALS — BODY MASS INDEX: 24.14 KG/M2 | TEMPERATURE: 97.9 F | WEIGHT: 132 LBS

## 2025-06-05 DIAGNOSIS — C44.612 BASAL CELL CARCINOMA (BCC) OF RIGHT UPPER ARM: Primary | ICD-10-CM

## 2025-06-05 PROCEDURE — 17261 DSTRJ MAL LES T/A/L .6-1.0CM: CPT | Performed by: DERMATOLOGY

## 2025-06-05 NOTE — PATIENT INSTRUCTIONS
What is curettage and desiccation?  Curettage and desiccation is a type of electrosurgery in which a skin lesion is scraped off and heat is applied to the skin surface.    What is involved in curettage and cautery?  Your doctor will explain to you why your skin lesion needs treatment and the procedure involved. You may have to sign a consent form to indicate that you consent to the surgical procedure. Tell your doctor if you are taking any medication, or if you have any allergies or medical conditions.  The doctor will inject some local anaesthetic into the area surrounding the lesion to be treated. This will make the skin go numb so no pain should be felt during the procedure. You may feel a pushing sensation but this should not be painful. The skin lesion is scraped off with a curette, which is like a small spoon with very sharp edges.  The lesion should be sent to a pathology laboratory for analysis. The wound surface is then cauterised with a hot wire beaded tip or electrosurgical unit (diathermy). This stops bleeding and helps destroys any remaining skin tumour cells.  This procedure is usually repeated twice for malignant skin lesions (serial curettage and cautery).  A dressing may be applied and instructions should be given on how to care for your wound.    What types of skin lesions can be treated by curettage?  Curettage is suitable to treat lesions where the material being scraped off is softer than the surrounding skin or when there is a natural cleavage plane between the lesion and the surrounding normal tissue. The following are sometimes treated by curettage:  Squamous cell carcinoma in situ   Actinic keratoses   Basal cell carcinomas that are large, deep or recurrent are usually not suitable for curettage. Lesions with poorly defined edges are also generally unsuitable.Curettage and desiccation is most often used in more superficial type basal cell carcinoma.    Will I have a scar?  Curettage often  results in some sort of scar especially if accompanied by cautery.   The scars from curettage are usually flat and round. They are a similar size to that of the original skin lesion. Some people have an abnormal response to skin healing and these people may get larger scars than usual (keloids and hypertrophic scarring).    How do I look after the wound following skin curettage?  The wound may be tender when the local anaesthetic wears off.  Leave the dressing in place till the nest day. Avoid strenuous exertion and stretching of the area.   If there is any bleeding, press on the wound firmly with a folded towel without looking at it for 30 minutes. If it is still bleeding after this time, seek medical attention.  Follow instructions a written in our consent ,  The wound from curettage will take approximately 2-3 weeks to heal over. The scar will initially be red and raised but usually reduces in colour and size over several months.

## 2025-06-05 NOTE — PROGRESS NOTES
"[facility] Note     Patient Name: Michelle Blanchard  Encounter Date: 6/5/2025       Have you been cared for by a [facility] Dermatologist in the last 3 years and, if so, which description applies to you? Yes. I have been here within the last 3 years, and my medical history has NOT changed since that time. I am of child-bearing potential.     REVIEW OF SYSTEMS:  Have you recently had or currently have any of the following? No changes in my recent health.   PAST MEDICAL HISTORY:  Have you personally ever had or currently have any of the following?  If \"YES,\" then please provide more detail. No changes in my medical history.   HISTORY OF IMMUNOSUPPRESSION: Do you have a history of any of the following:  Systemic Immunosuppression such as Diabetes, Biologic or Immunotherapy, Chemotherapy, Organ Transplantation, Bone Marrow Transplantation or Prednisone?  No     Answering \"YES\" requires the addition of the dotphrase \"IMMUNOSUPPRESSED\" as the first diagnosis of the patient's visit.   FAMILY HISTORY:  Any \"first degree relatives\" (parent, brother, sister, or child) with the following?    No changes in my family's known health.   PATIENT EXPERIENCE:    Do you want the Dermatologist to perform a COMPLETE skin exam today including a clinical examination under the \"bra and underwear\" areas?  NO  If necessary, do we have your permission to call and leave a detailed message on your Preferred Phone number that includes your specific medical information?  Yes      Allergies[1] Current Medications[2]              Whom besides the patient is providing clinical information about today's encounter?   NO ADDITIONAL HISTORIAN (patient alone provided history)    Physical Exam and Assessment/Plan by Diagnosis:      CURETTAGE AND DESICCATION Malignant Lesion    Diagnosis: Basal cell carcinoma ( Superficial/pigmented type)  Prior biopsy: Yes  Access Number: [id number]   Location right upper arm   Size preop 1 cm  Size " postop 1.3 cm     Additional History of Present Condition:  Biopsy proven BCC performed on 4/3/2025.  Patient previously counseled by Marah Reyna PA-C on options and elects ED&C. Slightly increased risk of recurrence was reviewed by me with patient today.    Assessment and Plan:  Based on a thorough discussion of this condition and the management approach to it (including a comprehensive discussion of the known risks, side effects and potential benefits of treatment), the patient (family) agrees to treat the above described lesion with desiccation and curettage.    Procedure:  The area was cleanly  prepped in usual manner  Anesthesia:1% xylocaine with epi    The above described lesion was aggressively curetted to mid dermis followed by desiccation  Number of cycles of desiccation and curettage 3  A clean dry dressing was placed on site. Oral and written postop care instructions were discussed and reviewed      DISCUSSION OF TREATMENT AND POSTOP CARE FOR PATIENT    What is curettage and desiccation?  Curettage and desiccation is a type of electrosurgery in which a skin lesion is scraped off and heat is applied to the skin surface.    What is involved in curettage and cautery?  Your doctor will explain to you why your skin lesion needs treatment and the procedure involved. You may have to sign a consent form to indicate that you consent to the surgical procedure. Tell your doctor if you are taking any medication, or if you have any allergies or medical conditions.  The doctor will inject some local anaesthetic into the area surrounding the lesion to be treated. This will make the skin go numb so no pain should be felt during the procedure. You may feel a pushing sensation but this should not be painful. The skin lesion is scraped off with a curette, which is like a small spoon with very sharp edges.  The lesion should be sent to a pathology laboratory for analysis. The wound surface is then cauterised with a hot  wire beaded tip or electrosurgical unit (diathermy). This stops bleeding and helps destroys any remaining skin tumour cells.  This procedure is usually repeated twice for malignant skin lesions (serial curettage and cautery).  A dressing may be applied and instructions should be given on how to care for your wound.    What types of skin lesions can be treated by curettage?  Curettage is suitable to treat lesions where the material being scraped off is softer than the surrounding skin or when there is a natural cleavage plane between the lesion and the surrounding normal tissue. The following are sometimes treated by curettage:  Squamous cell carcinoma in situ   Actinic keratoses   Basal cell carcinomas that are large, deep or recurrent are usually not suitable for curettage. Lesions with poorly defined edges are also generally unsuitable.Curettage and desiccation is most often used in more superficial type basal cell carcinoma.    Will I have a scar?  Curettage often results in some sort of scar especially if accompanied by cautery.   The scars from curettage are usually flat and round. They are a similar size to that of the original skin lesion. Some people have an abnormal response to skin healing and these people may get larger scars than usual (keloids and hypertrophic scarring).    How do I look after the wound following skin curettage?  The wound may be tender when the local anaesthetic wears off.  Leave the dressing in place till the nest day. Avoid strenuous exertion and stretching of the area.   If there is any bleeding, press on the wound firmly with a folded towel without looking at it for 30 minutes. If it is still bleeding after this time, seek medical attention.  Follow instructions a written in our consent ,  The wound from curettage will take approximately 2-3 weeks to heal over. The scar will initially be red and raised but usually reduces in colour and size over several months.  .    Scribe  Attestation      I,:  Ashwini Steele MA am acting as a scribe while in the presence of the attending physician.:       I,:  Rachel Ingram MD personally performed the services described in this documentation    as scribed in my presence.:                  [1] No Known Allergies  [2]   Current Outpatient Medications:     Cyanocobalamin (VITAMIN B 12 PO), Take 500 mg by mouth daily, Disp: , Rfl:     estradiol-norethindrone (ACTIVELLA) 1-0.5 MG per tablet, Take 1 tablet by mouth daily, Disp: , Rfl:     fexofenadine (ALLEGRA) 180 MG tablet, Take 180 mg by mouth daily as needed, Disp: , Rfl:     fluorouracil (EFUDEX) 5 % cream, Apply topically 2 (two) times a day, Disp: 40 g, Rfl: 0    fluticasone (FLONASE) 50 mcg/act nasal spray, 1 spray into each nostril daily, Disp: , Rfl:     levothyroxine 112 mcg tablet, , Disp: , Rfl:     levothyroxine 88 mcg tablet, Take 100 mcg by mouth daily, Disp: , Rfl:     Magnesium Gluconate 550 MG TABS, Take 1,000 mg by mouth daily, Disp: , Rfl:     meloxicam (MOBIC) 15 mg tablet, Take 7.5 mg by mouth daily, Disp: , Rfl:     meloxicam (MOBIC) 7.5 mg tablet, 7.5 mg, Disp: , Rfl:     methocarbamol (ROBAXIN) 500 mg tablet, Take 1 tablet (500 mg total) by mouth daily at bedtime as needed for muscle spasms, Disp: 10 tablet, Rfl: 0    metroNIDAZOLE (METROCREAM) 0.75 % cream, Apply topically twice daily, Disp: 45 g, Rfl: 6    Multiple Vitamins-Minerals (CENTRUM SILVER 50+WOMEN PO), Take by mouth in the morning, Disp: , Rfl:     olopatadine (PATANOL) 0.1 % ophthalmic solution, Apply 1 drop to eye if needed, Disp: , Rfl:     pantoprazole (PROTONIX) 40 mg tablet, Take 40 mg by mouth 2 (two) times a day, Disp: , Rfl:     prednisoLONE acetate (PRED FORTE) 1 % ophthalmic suspension, Apply 1 drop to eye daily, Disp: , Rfl:     Sulfacetamide Sodium-Sulfur 9.8-4.8 % LIQD, Wash daily, Disp: 1 Bottle, Rfl: 6

## 2025-06-06 ENCOUNTER — OFFICE VISIT (OUTPATIENT)
Dept: PHYSICAL THERAPY | Facility: REHABILITATION | Age: 65
End: 2025-06-06
Payer: MEDICARE

## 2025-06-06 DIAGNOSIS — M54.2 CERVICALGIA: Primary | ICD-10-CM

## 2025-06-06 DIAGNOSIS — M47.812 CERVICAL SPONDYLOSIS WITHOUT MYELOPATHY: ICD-10-CM

## 2025-06-06 PROCEDURE — 97140 MANUAL THERAPY 1/> REGIONS: CPT | Performed by: PHYSICAL THERAPIST

## 2025-06-06 PROCEDURE — 97112 NEUROMUSCULAR REEDUCATION: CPT | Performed by: PHYSICAL THERAPIST

## 2025-06-06 NOTE — PROGRESS NOTES
Daily Note     Today's date: 2025  Patient name: Michelle Blanchard  : 1960  MRN: [id number]  Referring provider: Lizette Lamb DO  Dx:   Encounter Diagnosis     ICD-10-CM    1. Cervicalgia  M54.2       2. Cervical spondylosis without myelopathy  M47.812           Start Time: 0815  Stop Time: 0845  Total time in clinic (min): 30 minutes    Subjective: Pt notes a sig decrease in pain, but she attributes this to a change in activity due to being on vacation.       Objective: See treatment diary below  HEP updated with wall walks.  Peach band issued.     Assessment: Tolerated treatment well. Patient would benefit from continued PT      Plan: Continue per plan of care.      Precautions: not sig      Manuals           Supine - sustained pressure - lev scap  LMR           Strumming - SCM  LMR           Tissue deformation - right UT Supine - LMR Seated - LMR Seated - LMR          Right rot cx NAG's  nv           SOR  LMR           Sustained pressure - right UT  LMR LMR          Inf right rib 1 mobs   LMR - gr 4          Parallel stripping - cx paraspinals  LMR                        Neuro Re-Ed                                                  XS - rows   7# - 2x13          XS - shoulder ext   3# - 2x7          Wall walks   Light grn - 3x3                       Ther Ex            Cx retraction 2x7            Low rows Grn - 2x15            HEP LMR            Pt education LMR                                                                  Ther Activity                                       Gait Training                                       Modalities

## 2025-06-10 ENCOUNTER — OFFICE VISIT (OUTPATIENT)
Dept: PHYSICAL THERAPY | Facility: REHABILITATION | Age: 65
End: 2025-06-10
Payer: MEDICARE

## 2025-06-10 DIAGNOSIS — M47.812 CERVICAL SPONDYLOSIS WITHOUT MYELOPATHY: ICD-10-CM

## 2025-06-10 DIAGNOSIS — M54.2 CERVICALGIA: Primary | ICD-10-CM

## 2025-06-10 PROCEDURE — 97110 THERAPEUTIC EXERCISES: CPT | Performed by: PHYSICAL THERAPIST

## 2025-06-10 PROCEDURE — 97112 NEUROMUSCULAR REEDUCATION: CPT | Performed by: PHYSICAL THERAPIST

## 2025-06-10 PROCEDURE — 97140 MANUAL THERAPY 1/> REGIONS: CPT | Performed by: PHYSICAL THERAPIST

## 2025-06-10 NOTE — PROGRESS NOTES
"Daily Note     Today's date: 6/10/2025  Patient name: Michelle Blanchard  : 1960  MRN: [id number]  Referring provider: Lizette Lamb DO  Dx:   Encounter Diagnosis     ICD-10-CM    1. Cervicalgia  M54.2       2. Cervical spondylosis without myelopathy  M47.812           Start Time: 0940  Stop Time: 1020  Total time in clinic (min): 40 minutes    Subjective: Pt was able to bike her usual 17 miles over the weekend without any increased sx's.       Objective: See treatment diary below  HEP updated with banded shoulder extension.     Assessment: Tolerated treatment well. Patient would benefit from continued PT      Plan: Continue per plan of care.      Precautions: not sig      Manuals 05/08 05/16 06/06 06/10         Supine - sustained pressure - lev scap  LMR           Strumming - SCM  LMR           Tissue deformation - right UT Supine - LMR Seated - LMR Seated - LMR Seated - LMR         Inf rib 1 mobs    Gr 4 - LMR         Right rot cx NAG's  nv           SOR  LMR           Sustained pressure - right UT  LMR LMR          Inf right rib 1 mobs   LMR - gr 4          Parallel stripping - cx paraspinals  LMR                        Neuro Re-Ed 05/08 05/16 06/06 06/10                      XS - OH pulls    3# - 2x5         XS - horiz abd    3# - 3x7         XS - rows   7# - 2x13          XS - shoulder ext   3# - 2x7 7# - 3x7         Wall walks   Light grn - 3x3 HEP                      Ther Ex 05/08 05/16  06/10         Cx retraction 2x7            Low rows Grn - 2x15            HEP LMR   LMR         Pt education LMR            OH foam rollouts on wall    3\"x9                                                  Ther Activity                                       Gait Training                                       Modalities                                                 "

## 2025-06-23 ENCOUNTER — OFFICE VISIT (OUTPATIENT)
Dept: PHYSICAL THERAPY | Facility: REHABILITATION | Age: 65
End: 2025-06-23
Payer: MEDICARE

## 2025-06-23 DIAGNOSIS — M47.812 CERVICAL SPONDYLOSIS WITHOUT MYELOPATHY: ICD-10-CM

## 2025-06-23 DIAGNOSIS — M54.2 CERVICALGIA: Primary | ICD-10-CM

## 2025-06-23 PROCEDURE — 97112 NEUROMUSCULAR REEDUCATION: CPT | Performed by: PHYSICAL THERAPIST

## 2025-06-23 PROCEDURE — 97140 MANUAL THERAPY 1/> REGIONS: CPT | Performed by: PHYSICAL THERAPIST

## 2025-06-23 NOTE — PROGRESS NOTES
"Daily Note     Today's date: 2025  Patient name: Michelle Blanchard  : 1960  MRN: [id number]  Referring provider: Lizette Lamb DO  Dx:   Encounter Diagnosis     ICD-10-CM    1. Cervicalgia  M54.2       2. Cervical spondylosis without myelopathy  M47.812           Start Time: 1445  Stop Time: 1525  Total time in clinic (min): 40 minutes    Subjective:   Since starting PT, she states her pain has decreased from a 8/10 to a 2/10.   Today, after manuals, she reports a 1/10.       Objective: See treatment diary below  HEP updated with banded horiz abduction and isometric horiz abduction holds against wall.     Assessment: Tolerated treatment well. Patient would benefit from continued PT      Plan: Continue per plan of care.      Precautions: not sig      Manuals 05/08 05/16 06/06 06/10 06/23        Supine - sustained pressure - lev scap  LMR           Strumming - SCM  LMR           Tissue deformation - right UT Supine - LMR Seated - LMR Seated - LMR Seated - LMR Seated - LMR        Inf rib 1 mobs    Gr 4 - LMR Gr 4 - LMR        Right rot cx NAG's  nv           SOR  LMR           Sustained pressure - right UT  LMR LMR          Inf right rib 1 mobs   LMR - gr 4          Parallel stripping - cx paraspinals  LMR           MFD - static - right UT     2 cups - 3 pumps - LMR        Neuro Re-Ed 05/08 05/16 06/06 06/10 06/23        HEP     LMR        XS - OH pulls    3# - 2x5         XS - horiz abd    3# - 3x7 3# - 3x5        XS - rows   7# - 2x13          Isometric 90/90 shoulder er holds - wall     5\"x5        Isometric horiz abd holds - wall     15\"x4        XS - shoulder ext   3# - 2x7 7# - 3x7 3# - 3x15        Wall walks   Light grn - 3x3 HEP                      Ther Ex 05/08 05/16  06/10         Cx retraction 2x7            Low rows Grn - 2x15            HEP LMR   LMR         Pt education LMR            OH foam rollouts on wall    3\"x9                                                  Ther Activity           "                             Gait Training                                       Modalities

## 2025-06-30 ENCOUNTER — OFFICE VISIT (OUTPATIENT)
Dept: PHYSICAL THERAPY | Facility: REHABILITATION | Age: 65
End: 2025-06-30
Payer: MEDICARE

## 2025-06-30 DIAGNOSIS — M54.2 CERVICALGIA: Primary | ICD-10-CM

## 2025-06-30 DIAGNOSIS — M47.812 CERVICAL SPONDYLOSIS WITHOUT MYELOPATHY: ICD-10-CM

## 2025-06-30 PROCEDURE — 97112 NEUROMUSCULAR REEDUCATION: CPT | Performed by: PHYSICAL THERAPIST

## 2025-06-30 PROCEDURE — 97140 MANUAL THERAPY 1/> REGIONS: CPT | Performed by: PHYSICAL THERAPIST

## 2025-06-30 NOTE — PROGRESS NOTES
"Daily Note     Today's date: 2025  Patient name: Michelle Blanchard  : 1960  MRN: [id number]  Referring provider: Lizette Lamb DO  Dx:   Encounter Diagnosis     ICD-10-CM    1. Cervicalgia  M54.2       2. Cervical spondylosis without myelopathy  M47.812           Start Time: 1445  Stop Time: 1525  Total time in clinic (min): 40 minutes    Subjective: She continues to report improvement. Pt biked 17 miles three consecutive days and played pickledball for 2 hours today without any aggravation of sx's.        Objective: See treatment diary below      Assessment: Tolerated treatment well. Patient would benefit from continued PT      Plan: Continue per plan of care.      Precautions: not sig      Manuals 05/08 05/16 06/06 06/10 06/23 06/30       Supine - sustained pressure - lev scap  LMR    LMR       Strumming - SCM  LMR           Tissue deformation - right UT Supine - LMR Seated - LMR Seated - LMR Seated - LMR Seated - LMR Seated - LMR       Inf rib 1 mobs    Gr 4 - LMR Gr 4 - LMR        Right rot cx NAG's  nv           SOR  LMR           Sustained pressure - right UT  LMR LMR          Inf right rib 1 mobs   LMR - gr 4          Parallel stripping - cx paraspinals  LMR           MFD - static - right UT     2 cups - 3 pumps - LMR        Neuro Re-Ed 05/08 05/16 06/06 06/10 06/23 06/30       HEP     LMR        XS - OH pulls    3# - 2x5         XS - horiz abd    3# - 3x7 3# - 3x5 3# - 2x7       XS - rows   7# - 2x13   10# - 2x15       Isometric 90/90 shoulder er holds - wall     5\"x5        Isometric horiz abd holds - wall     15\"x4        XS - shoulder ext   3# - 2x7 7# - 3x7 3# - 3x15        Wall walks   Light grn - 3x3 HEP         [place] - pnf D2 flexion      0.8# - 2x11       Ther Ex 05/08 05/16  06/10         Cx retraction 2x7            Low rows Grn - 2x15            HEP LMR   LMR         Pt education LMR            OH foam rollouts on wall    3\"x9                                                  Ther " Activity                                       Gait Training                                       Modalities

## 2025-07-10 ENCOUNTER — OFFICE VISIT (OUTPATIENT)
Dept: PHYSICAL THERAPY | Facility: REHABILITATION | Age: 65
End: 2025-07-10
Payer: MEDICARE

## 2025-07-10 DIAGNOSIS — M47.812 CERVICAL SPONDYLOSIS WITHOUT MYELOPATHY: ICD-10-CM

## 2025-07-10 DIAGNOSIS — M54.2 CERVICALGIA: Primary | ICD-10-CM

## 2025-07-10 PROCEDURE — 97110 THERAPEUTIC EXERCISES: CPT | Performed by: PHYSICAL THERAPIST

## 2025-07-10 PROCEDURE — 97140 MANUAL THERAPY 1/> REGIONS: CPT | Performed by: PHYSICAL THERAPIST

## 2025-07-10 NOTE — PROGRESS NOTES
"Daily Note     Today's date: 7/10/2025  Patient name: Michelle Blanchard  : 1960  MRN: [id number]  Referring provider: Lizette Lamb DO  Dx:   Encounter Diagnosis     ICD-10-CM    1. Cervicalgia  M54.2       2. Cervical spondylosis without myelopathy  M47.812           Start Time: 1400  Stop Time: 1440  Total time in clinic (min): 40 minutes    Subjective: Pt biked 17 miles two consecutive days.  She didn't feel any neck pain yesterday, but today she experienced some tightness/sx's.     Objective: See treatment diary below  Pt educated on the use of a S-shaped neck massager.     Assessment: Tolerated treatment well. Patient would benefit from continued PT    Plan: d/c     Precautions: not sig      Manuals 05/08 05/16 06/06 06/10 06/23 06/30 07/10      Supine - sustained pressure - lev scap  LMR    LMR LMR      Strumming - SCM  LMR           Tissue deformation - right UT Supine - LMR Seated - LMR Seated - LMR Seated - LMR Seated - LMR Seated - LMR LMR      Inf rib 1 mobs    Gr 4 - LMR Gr 4 - LMR  Gr 4 - LMR      Right rot cx NAG's  nv           SOR  LMR           Sustained pressure - right UT  LMR LMR          Inf right rib 1 mobs   LMR - gr 4          Parallel stripping - cx paraspinals  LMR           MFD - static - right UT     2 cups - 3 pumps - LMR        Neuro Re-Ed 05/08 05/16 06/06 06/10 06/23 06/30 07/10      HEP     LMR        XS - OH pulls    3# - 2x5         XS - horiz abd    3# - 3x7 3# - 3x5 3# - 2x7       XS - rows   7# - 2x13   10# - 2x15       Isometric 90/90 shoulder er holds - wall     5\"x5        Isometric horiz abd holds - wall     15\"x4        XS - shoulder ext   3# - 2x7 7# - 3x7 3# - 3x15        Wall walks   Light grn - 3x3 HEP         [place] - pnf D2 flexion      0.8# - 2x11       Ther Ex 05/08 05/16  06/10   07/10      Cx retraction 2x7            Low rows Grn - 2x15            HEP LMR   LMR   LMR      Pt education LMR            OH foam rollouts on wall    3\"x9         Corner stretch " "      30\"x4      theracane       2'                     Ther Activity                                       Gait Training                                       Modalities                                                       "

## 2025-09-03 ENCOUNTER — ESTABLISHED COMPREHENSIVE EXAM (OUTPATIENT)
Dept: URBAN - METROPOLITAN AREA CLINIC 6 | Facility: CLINIC | Age: 65
End: 2025-09-03

## 2025-09-03 DIAGNOSIS — H10.45: ICD-10-CM

## 2025-09-03 DIAGNOSIS — H11.442: ICD-10-CM

## 2025-09-03 DIAGNOSIS — D31.32: ICD-10-CM

## 2025-09-03 DIAGNOSIS — H11.421: ICD-10-CM

## 2025-09-03 PROCEDURE — 92250 FUNDUS PHOTOGRAPHY W/I&R: CPT

## 2025-09-03 PROCEDURE — 99214 OFFICE O/P EST MOD 30 MIN: CPT

## 2025-09-03 ASSESSMENT — VISUAL ACUITY
OD_SC: 20/20
OS_SC: 20/20

## 2025-09-03 ASSESSMENT — TONOMETRY
OD_IOP_MMHG: 18
OS_IOP_MMHG: 16